# Patient Record
Sex: FEMALE | Race: NATIVE HAWAIIAN OR OTHER PACIFIC ISLANDER | Employment: FULL TIME | URBAN - METROPOLITAN AREA
[De-identification: names, ages, dates, MRNs, and addresses within clinical notes are randomized per-mention and may not be internally consistent; named-entity substitution may affect disease eponyms.]

---

## 2017-03-21 ENCOUNTER — EMPLOYEE WELLNESS (OUTPATIENT)
Dept: OTHER | Age: 42
End: 2017-03-21

## 2017-03-21 LAB
CHOLESTEROL: 188 MG/DL
GLUCOSE BLD-MCNC: 155 MG/DL (ref 70–140)
HBA1C MFR BLD: 5.7 % (ref 4.2–6.3)
HDLC SERPL-MCNC: 47 MG/DL
LDL CHOLESTEROL CALCULATED: 91 MG/DL
PATIENT FASTING?: NO
TRIGL SERPL-MCNC: 252 MG/DL

## 2018-03-20 VITALS — WEIGHT: 177 LBS | BODY MASS INDEX: 30.38 KG/M2

## 2018-03-21 ENCOUNTER — EMPLOYEE WELLNESS (OUTPATIENT)
Dept: OTHER | Age: 43
End: 2018-03-21

## 2018-03-21 LAB
CHOLESTEROL: 194 MG/DL
GLUCOSE BLD-MCNC: 99 MG/DL (ref 70–99)
HDLC SERPL-MCNC: 49 MG/DL
LDL CHOLESTEROL CALCULATED: 108 MG/DL
PATIENT FASTING?: YES
TRIGL SERPL-MCNC: 185 MG/DL

## 2018-04-16 VITALS — BODY MASS INDEX: 31.41 KG/M2 | WEIGHT: 183 LBS

## 2018-09-17 ENCOUNTER — HOSPITAL ENCOUNTER (OUTPATIENT)
Dept: OTHER | Age: 43
Discharge: HOME | End: 2018-09-17
Attending: OBSTETRICS & GYNECOLOGY | Admitting: OBSTETRICS & GYNECOLOGY

## 2018-09-22 ENCOUNTER — HOSPITAL ENCOUNTER (OUTPATIENT)
Age: 43
Discharge: HOME | End: 2018-09-22
Payer: COMMERCIAL

## 2018-09-22 LAB
FOLLICLE STIMULATING HORMONE: 4.3 MLU/ML
TSH HIGH SENSITIVITY: 3.49 UIU/ML (ref 0.27–4.2)

## 2018-09-22 PROCEDURE — 82670 ASSAY OF TOTAL ESTRADIOL: CPT

## 2018-09-22 PROCEDURE — 84436 ASSAY OF TOTAL THYROXINE: CPT

## 2018-09-22 PROCEDURE — 84479 ASSAY OF THYROID (T3 OR T4): CPT

## 2018-09-22 PROCEDURE — 36415 COLL VENOUS BLD VENIPUNCTURE: CPT

## 2018-09-22 PROCEDURE — 84443 ASSAY THYROID STIM HORMONE: CPT

## 2018-09-22 PROCEDURE — 83001 ASSAY OF GONADOTROPIN (FSH): CPT

## 2018-09-25 LAB
ESTRADIOL LEVEL: 167
T3 UPTAKE PERCENT: 31
T4 TOTAL: 5.36 UG/DL

## 2020-06-05 RX ORDER — IBUPROFEN 800 MG/1
800 TABLET ORAL EVERY 6 HOURS PRN
COMMUNITY
End: 2022-11-01 | Stop reason: SDUPTHER

## 2020-06-08 ENCOUNTER — OFFICE VISIT (OUTPATIENT)
Dept: FAMILY MEDICINE CLINIC | Age: 45
End: 2020-06-08
Payer: COMMERCIAL

## 2020-06-08 VITALS
OXYGEN SATURATION: 98 % | DIASTOLIC BLOOD PRESSURE: 82 MMHG | SYSTOLIC BLOOD PRESSURE: 122 MMHG | HEIGHT: 64 IN | BODY MASS INDEX: 32.44 KG/M2 | HEART RATE: 82 BPM | TEMPERATURE: 98.4 F | WEIGHT: 190 LBS

## 2020-06-08 PROCEDURE — 99202 OFFICE O/P NEW SF 15 MIN: CPT | Performed by: NURSE PRACTITIONER

## 2020-06-08 RX ORDER — METHYLPREDNISOLONE 4 MG/1
TABLET ORAL
Qty: 1 KIT | Refills: 0 | Status: SHIPPED | OUTPATIENT
Start: 2020-06-08 | End: 2020-06-14

## 2020-06-08 RX ORDER — KETOCONAZOLE 20 MG/ML
SHAMPOO TOPICAL
Qty: 120 ML | Refills: 0 | Status: SHIPPED | OUTPATIENT
Start: 2020-06-08 | End: 2022-08-22

## 2020-06-08 ASSESSMENT — ENCOUNTER SYMPTOMS
VOMITING: 0
RESPIRATORY NEGATIVE: 1
NAUSEA: 0
DIARRHEA: 0

## 2020-06-08 NOTE — PROGRESS NOTES
Moiz Morrison   45 y.o.  female  [id number]      Chief Complaint   Patient presents with    Muscle Pain     c/o muscle aches chills and pain causing finger numbness onset amonth ago        Subjective:  45 y. o.female is here for a follow up. She has the following chronic/acute medical problems:  Patient Active Problem List   Diagnosis    Abnormal uterine bleeding       Patient is here today with complaints of bilateral shoulder pain and right wrist pain with numbness and tingling to her right fingers. Patient states this is been going on for a couple months but has progressively got worse. Patient states when she wakes up in the morning she has trouble moving her shoulders above her head to get dressed. Patient states her left shoulder is worse than her right shoulder. Patient states as the day goes on the pain is better. Patient states ibuprofen does help with the pain. Patient states she believes she has some psoriasis of her scalp. She states she was given some foam at an urgent care. She states she would like to try the shampoo. Review of Systems   Constitutional: Negative for appetite change, chills, fatigue and fever. HENT: Negative. Respiratory: Negative. Cardiovascular: Negative for chest pain and palpitations. Gastrointestinal: Negative for diarrhea, nausea and vomiting. Musculoskeletal:        Bilateral shoulder pain. Right wrist pain. Skin: Negative for rash. Current Outpatient Medications   Medication Sig Dispense Refill    ketoconazole (NIZORAL) 2 % shampoo Apply topically daily as needed. 120 mL 0    methylPREDNISolone (MEDROL, EMILIE,) 4 MG tablet Take by mouth. 1 kit 0    ibuprofen (ADVIL;MOTRIN) 800 MG tablet Take 800 mg by mouth every 6 hours as needed for Pain       No current facility-administered medications for this visit. Past medical, family,surgical history reviewed today.      Objective:  /82 (Site: Left Upper Arm, Position: Sitting)

## 2020-06-10 ENCOUNTER — VIRTUAL VISIT (OUTPATIENT)
Dept: FAMILY MEDICINE CLINIC | Age: 45
End: 2020-06-10
Payer: COMMERCIAL

## 2020-06-10 PROCEDURE — 99202 OFFICE O/P NEW SF 15 MIN: CPT | Performed by: FAMILY MEDICINE

## 2020-06-10 RX ORDER — DULOXETIN HYDROCHLORIDE 30 MG/1
30 CAPSULE, DELAYED RELEASE ORAL DAILY
Qty: 30 CAPSULE | Refills: 0 | Status: SHIPPED | OUTPATIENT
Start: 2020-06-10 | End: 2020-06-22 | Stop reason: SDUPTHER

## 2020-06-10 ASSESSMENT — PATIENT HEALTH QUESTIONNAIRE - PHQ9
SUM OF ALL RESPONSES TO PHQ QUESTIONS 1-9: 0
2. FEELING DOWN, DEPRESSED OR HOPELESS: 0
1. LITTLE INTEREST OR PLEASURE IN DOING THINGS: 0
SUM OF ALL RESPONSES TO PHQ9 QUESTIONS 1 & 2: 0
SUM OF ALL RESPONSES TO PHQ QUESTIONS 1-9: 0

## 2020-06-11 ASSESSMENT — ENCOUNTER SYMPTOMS: SHORTNESS OF BREATH: 0

## 2020-06-17 ENCOUNTER — TELEPHONE (OUTPATIENT)
Dept: INTERNAL MEDICINE CLINIC | Age: 45
End: 2020-06-17

## 2020-06-17 NOTE — TELEPHONE ENCOUNTER
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 Patient expressed understanding.

## 2020-06-17 NOTE — TELEPHONE ENCOUNTER
Pt called and stated that she went to flu clinic located on [facility] and had an order to do the test. Pt stated that they took the a but there is nothing in Epic. Pt states she got there at 8 and could not find her order so she left and then returned from [facility]) at 9 am to have the test done, ot stated test was done. No results and no sign of pt at flu clinic. I sp to lynne and she stated pt might want to call [facility].  Sp to pt and she will call their but is asking for a call to clear up from raciel please advise

## 2020-06-22 ENCOUNTER — TELEPHONE (OUTPATIENT)
Dept: FAMILY MEDICINE CLINIC | Age: 45
End: 2020-06-22

## 2020-06-22 RX ORDER — DULOXETIN HYDROCHLORIDE 60 MG/1
60 CAPSULE, DELAYED RELEASE ORAL DAILY
Qty: 30 CAPSULE | Refills: 0 | Status: SHIPPED | OUTPATIENT
Start: 2020-06-22 | End: 2020-08-07 | Stop reason: SDUPTHER

## 2020-06-22 NOTE — TELEPHONE ENCOUNTER
Patient state the swelling started a month ago. This is the same symptoms she had at her visit. Patient state her right hand is swollen she can not make a fist.  Left hand, arm, and leg hurts. Worse in the morning it is hard for her to get out of bed. Also in the morning her feet is tender. A hot shower helps her some. But patient state pain was getting better on the prednisone but once she completed it on Saturday the pain came back.

## 2020-06-24 ENCOUNTER — HOSPITAL ENCOUNTER (OUTPATIENT)
Age: 45
Discharge: HOME | End: 2020-06-24
Payer: COMMERCIAL

## 2020-06-24 LAB
ALBUMIN SERPL-MCNC: 4.3 GM/DL (ref 3.4–5)
ALP BLD-CCNC: 60 IU/L (ref 40–129)
ALT SERPL-CCNC: 33 U/L (ref 10–40)
ANION GAP SERPL CALCULATED.3IONS-SCNC: 12 MMOL/L (ref 4–16)
AST SERPL-CCNC: 19 IU/L (ref 15–37)
BILIRUB SERPL-MCNC: 0.6 MG/DL (ref 0–1)
BUN BLDV-MCNC: 11 MG/DL (ref 6–23)
C-REACTIVE PROTEIN, HIGH SENSITIVITY: 101.9 MG/L
CALCIUM SERPL-MCNC: 9.1 MG/DL (ref 8.3–10.6)
CHLORIDE BLD-SCNC: 99 MMOL/L (ref 99–110)
CO2: 25 MMOL/L (ref 21–32)
CREAT SERPL-MCNC: 0.7 MG/DL (ref 0.6–1.1)
ERYTHROCYTE SEDIMENTATION RATE: 103 MM/HR (ref 0–20)
FOLATE: 11.6 NG/ML (ref 3.1–17.5)
GFR AFRICAN AMERICAN: >60 ML/MIN/1.73M2
GFR NON-AFRICAN AMERICAN: >60 ML/MIN/1.73M2
GLUCOSE BLD-MCNC: 109 MG/DL (ref 70–99)
POTASSIUM SERPL-SCNC: 3.5 MMOL/L (ref 3.5–5.1)
SODIUM BLD-SCNC: 136 MMOL/L (ref 135–145)
TOTAL PROTEIN: 7.4 GM/DL (ref 6.4–8.2)
TSH HIGH SENSITIVITY: 4.85 UIU/ML (ref 0.27–4.2)
VITAMIN B-12: 255.3 PG/ML (ref 211–911)
VITAMIN D 25-HYDROXY: 8.11 NG/ML

## 2020-06-24 PROCEDURE — 36415 COLL VENOUS BLD VENIPUNCTURE: CPT

## 2020-06-24 PROCEDURE — 82746 ASSAY OF FOLIC ACID SERUM: CPT

## 2020-06-24 PROCEDURE — 86038 ANTINUCLEAR ANTIBODIES: CPT

## 2020-06-24 PROCEDURE — 82607 VITAMIN B-12: CPT

## 2020-06-24 PROCEDURE — 84443 ASSAY THYROID STIM HORMONE: CPT

## 2020-06-24 PROCEDURE — 86430 RHEUMATOID FACTOR TEST QUAL: CPT

## 2020-06-24 PROCEDURE — 85652 RBC SED RATE AUTOMATED: CPT

## 2020-06-24 PROCEDURE — 82306 VITAMIN D 25 HYDROXY: CPT

## 2020-06-24 PROCEDURE — 80053 COMPREHEN METABOLIC PANEL: CPT

## 2020-06-24 PROCEDURE — 86140 C-REACTIVE PROTEIN: CPT

## 2020-06-26 LAB
ANTI-NUCLEAR ANTIBODY (ANA): NORMAL
RHEUMATOID FACTOR: 21 IU/ML (ref 0–14)

## 2020-06-26 RX ORDER — PREDNISONE 20 MG/1
40 TABLET ORAL DAILY
Qty: 10 TABLET | Refills: 0 | Status: SHIPPED | OUTPATIENT
Start: 2020-06-26 | End: 2020-06-29 | Stop reason: SDUPTHER

## 2020-06-29 RX ORDER — PREDNISONE 20 MG/1
TABLET ORAL
Qty: 10 TABLET | Refills: 0 | Status: SHIPPED | OUTPATIENT
Start: 2020-06-29 | End: 2022-08-22

## 2020-07-13 ENCOUNTER — TELEPHONE (OUTPATIENT)
Dept: FAMILY MEDICINE CLINIC | Age: 45
End: 2020-07-13

## 2020-07-13 RX ORDER — PREDNISONE 1 MG/1
5 TABLET ORAL DAILY
Qty: 30 TABLET | Refills: 0 | Status: SHIPPED | OUTPATIENT
Start: 2020-07-13 | End: 2020-08-12

## 2020-07-13 NOTE — TELEPHONE ENCOUNTER
Patient called asking if she can have Refill on Prednisone. She does not see specialist on 7-28-20. She stated that taking a 1/2 dose did not help at all and would like to take the full dose of 20 mg.

## 2020-07-21 ENCOUNTER — HOSPITAL ENCOUNTER (OUTPATIENT)
Age: 45
Discharge: HOME | End: 2020-07-21
Payer: COMMERCIAL

## 2020-07-21 LAB
ALT SERPL-CCNC: 30 U/L (ref 10–40)
AST SERPL-CCNC: 15 IU/L (ref 15–37)
ERYTHROCYTE SEDIMENTATION RATE: 58 MM/HR (ref 0–20)
HBV SURFACE AB TITR SER: 437.8 {TITER}
HEPATITIS B SURFACE ANTIGEN: NON REACTIVE
HEPATITIS C ANTIBODY: NON REACTIVE

## 2020-07-21 PROCEDURE — 36415 COLL VENOUS BLD VENIPUNCTURE: CPT

## 2020-07-21 PROCEDURE — 86225 DNA ANTIBODY NATIVE: CPT

## 2020-07-21 PROCEDURE — 86704 HEP B CORE ANTIBODY TOTAL: CPT

## 2020-07-21 PROCEDURE — 84450 TRANSFERASE (AST) (SGOT): CPT

## 2020-07-21 PROCEDURE — 84460 ALANINE AMINO (ALT) (SGPT): CPT

## 2020-07-21 PROCEDURE — 86235 NUCLEAR ANTIGEN ANTIBODY: CPT

## 2020-07-21 PROCEDURE — 86038 ANTINUCLEAR ANTIBODIES: CPT

## 2020-07-21 PROCEDURE — 86803 HEPATITIS C AB TEST: CPT

## 2020-07-21 PROCEDURE — 86707 HEPATITIS BE ANTIBODY: CPT

## 2020-07-21 PROCEDURE — 85652 RBC SED RATE AUTOMATED: CPT

## 2020-07-21 PROCEDURE — 86706 HEP B SURFACE ANTIBODY: CPT

## 2020-07-21 PROCEDURE — 87350 HEPATITIS BE AG IA: CPT

## 2020-07-21 PROCEDURE — 84165 PROTEIN E-PHORESIS SERUM: CPT

## 2020-07-21 PROCEDURE — 86200 CCP ANTIBODY: CPT

## 2020-07-21 PROCEDURE — 87340 HEPATITIS B SURFACE AG IA: CPT

## 2020-07-22 LAB
ALBUMIN ELP: 3.8 GM/DL (ref 3.2–5.6)
ALPHA-1-GLOBULIN: 0.3 GM/DL (ref 0.1–0.4)
ALPHA-2-GLOBULIN: 1 GM/DL (ref 0.4–1.2)
BETA GLOBULIN: 1.2 GM/DL (ref 0.5–1.3)
CENTROMERE AB SCREEN: 0 AU/ML (ref 0–40)
ENA TO SSA (RO) ANTIBODY: 2 AU/ML (ref 0–40)
ENA TO SSB (LA) ANTIBODY: 0 AU/ML (ref 0–40)
GAMMA GLOBULIN: 0.9 GM/DL (ref 0.5–1.6)
HEPATITIS B CORE TOTAL ANTIBODY: NEGATIVE
HEPATITIS BE ANTIBODY: NEGATIVE
SCLERODERMA (SCL-70) AB: 1 AU/ML (ref 0–40)
SPEP INTERPRETATION: NORMAL
SSA 60 RO IGG ANTIBODY: 0 AU/ML (ref 0–40)
TOTAL PROTEIN: 7.2 GM/DL (ref 6.4–8.2)

## 2020-07-23 LAB
ANTI DNA DOUBLE STRANDED: NORMAL
ANTI-NUCLEAR ANTIBODY (ANA): NORMAL
CYCLIC CITRULLINATED PEPTIDE ANTIBODY IGG: 10 UNITS (ref 0–19)
HEPATITIS BE ANTIGEN: NEGATIVE

## 2020-08-07 RX ORDER — DULOXETIN HYDROCHLORIDE 60 MG/1
60 CAPSULE, DELAYED RELEASE ORAL DAILY
Qty: 90 CAPSULE | Refills: 0 | Status: SHIPPED | OUTPATIENT
Start: 2020-08-07 | End: 2022-07-11

## 2020-09-08 ENCOUNTER — HOSPITAL ENCOUNTER (OUTPATIENT)
Age: 45
Setting detail: SPECIMEN
Discharge: HOME | End: 2020-09-08
Payer: COMMERCIAL

## 2020-09-08 LAB
ALT SERPL-CCNC: 26 U/L (ref 10–40)
AST SERPL-CCNC: 29 IU/L (ref 15–37)
BASOPHILS ABSOLUTE: 0.1 K/CU MM
BASOPHILS RELATIVE PERCENT: 0.4 % (ref 0–1)
DIFFERENTIAL TYPE: ABNORMAL
EOSINOPHILS ABSOLUTE: 0.1 K/CU MM
EOSINOPHILS RELATIVE PERCENT: 0.4 % (ref 0–3)
HCT VFR BLD CALC: 36.4 % (ref 37–47)
HEMOGLOBIN: 11 GM/DL (ref 12.5–16)
IMMATURE NEUTROPHIL %: 0.6 % (ref 0–0.43)
LYMPHOCYTES ABSOLUTE: 1.8 K/CU MM
LYMPHOCYTES RELATIVE PERCENT: 15 % (ref 24–44)
MCH RBC QN AUTO: 30.8 PG (ref 27–31)
MCHC RBC AUTO-ENTMCNC: 30.2 % (ref 32–36)
MCV RBC AUTO: 102 FL (ref 78–100)
MONOCYTES ABSOLUTE: 0.7 K/CU MM
MONOCYTES RELATIVE PERCENT: 5.6 % (ref 0–4)
NUCLEATED RBC %: 0 %
PDW BLD-RTO: 14.5 % (ref 11.7–14.9)
PLATELET # BLD: 339 K/CU MM (ref 140–440)
PMV BLD AUTO: 9.6 FL (ref 7.5–11.1)
RBC # BLD: 3.57 M/CU MM (ref 4.2–5.4)
SEGMENTED NEUTROPHILS ABSOLUTE COUNT: 9.1 K/CU MM
SEGMENTED NEUTROPHILS RELATIVE PERCENT: 78 % (ref 36–66)
TOTAL IMMATURE NEUTOROPHIL: 0.07 K/CU MM
TOTAL NUCLEATED RBC: 0 K/CU MM
WBC # BLD: 11.7 K/CU MM (ref 4–10.5)

## 2020-09-08 PROCEDURE — 85025 COMPLETE CBC W/AUTO DIFF WBC: CPT

## 2020-09-08 PROCEDURE — 84460 ALANINE AMINO (ALT) (SGPT): CPT

## 2020-09-08 PROCEDURE — 84450 TRANSFERASE (AST) (SGOT): CPT

## 2020-09-08 PROCEDURE — 36415 COLL VENOUS BLD VENIPUNCTURE: CPT

## 2020-10-22 ENCOUNTER — HOSPITAL ENCOUNTER (OUTPATIENT)
Age: 45
Discharge: HOME | End: 2020-10-22
Payer: COMMERCIAL

## 2020-10-22 LAB
ALT SERPL-CCNC: 33 U/L (ref 10–40)
AST SERPL-CCNC: 14 IU/L (ref 15–37)
BASOPHILS ABSOLUTE: 0 K/CU MM
BASOPHILS RELATIVE PERCENT: 0.3 % (ref 0–1)
DIFFERENTIAL TYPE: ABNORMAL
EOSINOPHILS ABSOLUTE: 0 K/CU MM
EOSINOPHILS RELATIVE PERCENT: 0.4 % (ref 0–3)
ERYTHROCYTE SEDIMENTATION RATE: 25 MM/HR (ref 0–20)
HCT VFR BLD CALC: 37.4 % (ref 37–47)
HEMOGLOBIN: 11.9 GM/DL (ref 12.5–16)
IMMATURE NEUTROPHIL %: 0.8 % (ref 0–0.43)
LYMPHOCYTES ABSOLUTE: 2.5 K/CU MM
LYMPHOCYTES RELATIVE PERCENT: 31.8 % (ref 24–44)
MCH RBC QN AUTO: 30.7 PG (ref 27–31)
MCHC RBC AUTO-ENTMCNC: 31.8 % (ref 32–36)
MCV RBC AUTO: 96.4 FL (ref 78–100)
MONOCYTES ABSOLUTE: 0.4 K/CU MM
MONOCYTES RELATIVE PERCENT: 5.1 % (ref 0–4)
NUCLEATED RBC %: 0 %
PDW BLD-RTO: 14.5 % (ref 11.7–14.9)
PLATELET # BLD: 301 K/CU MM (ref 140–440)
PMV BLD AUTO: 9.3 FL (ref 7.5–11.1)
RBC # BLD: 3.88 M/CU MM (ref 4.2–5.4)
SEGMENTED NEUTROPHILS ABSOLUTE COUNT: 4.8 K/CU MM
SEGMENTED NEUTROPHILS RELATIVE PERCENT: 61.6 % (ref 36–66)
TOTAL IMMATURE NEUTOROPHIL: 0.06 K/CU MM
TOTAL NUCLEATED RBC: 0 K/CU MM
WBC # BLD: 7.8 K/CU MM (ref 4–10.5)

## 2020-10-22 PROCEDURE — 36415 COLL VENOUS BLD VENIPUNCTURE: CPT

## 2020-10-22 PROCEDURE — 86480 TB TEST CELL IMMUN MEASURE: CPT

## 2020-10-22 PROCEDURE — 84460 ALANINE AMINO (ALT) (SGPT): CPT

## 2020-10-22 PROCEDURE — 85025 COMPLETE CBC W/AUTO DIFF WBC: CPT

## 2020-10-22 PROCEDURE — 85652 RBC SED RATE AUTOMATED: CPT

## 2020-10-22 PROCEDURE — 84450 TRANSFERASE (AST) (SGOT): CPT

## 2020-10-27 LAB
QUANTI TB1 MINUS NIL: 0 IU/ML (ref 0–0.34)
QUANTI TB2 MINUS NIL: 0.11 IU/ML (ref 0–0.34)
QUANTIFERON (R) TB GOLD (INCUBATED): NEGATIVE
QUANTIFERON MITOGEN MINUS NIL: 4.43 IU/ML
QUANTIFERON NIL: 0.07 IU/ML

## 2021-04-24 ENCOUNTER — HOSPITAL ENCOUNTER (OUTPATIENT)
Age: 46
Discharge: HOME | End: 2021-04-24
Payer: COMMERCIAL

## 2021-04-24 LAB
ALT SERPL-CCNC: 115 U/L (ref 10–40)
AST SERPL-CCNC: 63 IU/L (ref 15–37)
BASOPHILS ABSOLUTE: 0 K/CU MM
BASOPHILS RELATIVE PERCENT: 0.7 % (ref 0–1)
BUN BLDV-MCNC: 16 MG/DL (ref 6–23)
CREAT SERPL-MCNC: 0.7 MG/DL (ref 0.6–1.1)
DIFFERENTIAL TYPE: ABNORMAL
EOSINOPHILS ABSOLUTE: 0.1 K/CU MM
EOSINOPHILS RELATIVE PERCENT: 1 % (ref 0–3)
GFR AFRICAN AMERICAN: >60 ML/MIN/1.73M2
GFR NON-AFRICAN AMERICAN: >60 ML/MIN/1.73M2
HCT VFR BLD CALC: 38 % (ref 37–47)
HEMOGLOBIN: 12.9 GM/DL (ref 12.5–16)
IMMATURE NEUTROPHIL %: 0.2 % (ref 0–0.43)
LYMPHOCYTES ABSOLUTE: 2.2 K/CU MM
LYMPHOCYTES RELATIVE PERCENT: 38.1 % (ref 24–44)
MCH RBC QN AUTO: 30.2 PG (ref 27–31)
MCHC RBC AUTO-ENTMCNC: 33.9 % (ref 32–36)
MCV RBC AUTO: 89 FL (ref 78–100)
MONOCYTES ABSOLUTE: 0.4 K/CU MM
MONOCYTES RELATIVE PERCENT: 6.8 % (ref 0–4)
NUCLEATED RBC %: 0 %
PDW BLD-RTO: 12.9 % (ref 11.7–14.9)
PLATELET # BLD: 266 K/CU MM (ref 140–440)
PMV BLD AUTO: 9.8 FL (ref 7.5–11.1)
RBC # BLD: 4.27 M/CU MM (ref 4.2–5.4)
SEGMENTED NEUTROPHILS ABSOLUTE COUNT: 3.1 K/CU MM
SEGMENTED NEUTROPHILS RELATIVE PERCENT: 53.2 % (ref 36–66)
TOTAL IMMATURE NEUTOROPHIL: 0.01 K/CU MM
TOTAL NUCLEATED RBC: 0 K/CU MM
WBC # BLD: 5.8 K/CU MM (ref 4–10.5)

## 2021-04-24 PROCEDURE — 85025 COMPLETE CBC W/AUTO DIFF WBC: CPT

## 2021-04-24 PROCEDURE — 84520 ASSAY OF UREA NITROGEN: CPT

## 2021-04-24 PROCEDURE — 82565 ASSAY OF CREATININE: CPT

## 2021-04-24 PROCEDURE — 36415 COLL VENOUS BLD VENIPUNCTURE: CPT

## 2021-04-24 PROCEDURE — 84450 TRANSFERASE (AST) (SGOT): CPT

## 2021-04-24 PROCEDURE — 86480 TB TEST CELL IMMUN MEASURE: CPT

## 2021-04-24 PROCEDURE — 84460 ALANINE AMINO (ALT) (SGPT): CPT

## 2021-04-27 LAB
QUANTI TB1 MINUS NIL: 0.05 IU/ML (ref 0–0.34)
QUANTI TB2 MINUS NIL: 0.11 IU/ML (ref 0–0.34)
QUANTIFERON (R) TB GOLD (INCUBATED): NEGATIVE IU/ML
QUANTIFERON MITOGEN MINUS NIL: 7.82 IU/ML
QUANTIFERON NIL: 0.09 IU/ML

## 2021-05-25 ENCOUNTER — HOSPITAL ENCOUNTER (OUTPATIENT)
Age: 46
Discharge: HOME | End: 2021-05-25
Payer: COMMERCIAL

## 2021-05-25 LAB
ALT SERPL-CCNC: 132 U/L (ref 10–40)
AST SERPL-CCNC: 82 IU/L (ref 15–37)
BASOPHILS ABSOLUTE: 0 K/CU MM
BASOPHILS RELATIVE PERCENT: 0.4 % (ref 0–1)
BUN BLDV-MCNC: 10 MG/DL (ref 6–23)
CREAT SERPL-MCNC: 0.5 MG/DL (ref 0.6–1.1)
DIFFERENTIAL TYPE: ABNORMAL
EOSINOPHILS ABSOLUTE: 0.1 K/CU MM
EOSINOPHILS RELATIVE PERCENT: 1.1 % (ref 0–3)
GFR AFRICAN AMERICAN: >60 ML/MIN/1.73M2
GFR NON-AFRICAN AMERICAN: >60 ML/MIN/1.73M2
HCT VFR BLD CALC: 35.8 % (ref 37–47)
HEMOGLOBIN: 12.2 GM/DL (ref 12.5–16)
IMMATURE NEUTROPHIL %: 0.5 % (ref 0–0.43)
LYMPHOCYTES ABSOLUTE: 2.9 K/CU MM
LYMPHOCYTES RELATIVE PERCENT: 38.8 % (ref 24–44)
MCH RBC QN AUTO: 30.6 PG (ref 27–31)
MCHC RBC AUTO-ENTMCNC: 34.1 % (ref 32–36)
MCV RBC AUTO: 89.7 FL (ref 78–100)
MONOCYTES ABSOLUTE: 0.5 K/CU MM
MONOCYTES RELATIVE PERCENT: 6.3 % (ref 0–4)
NUCLEATED RBC %: 0 %
PDW BLD-RTO: 13 % (ref 11.7–14.9)
PLATELET # BLD: 275 K/CU MM (ref 140–440)
PMV BLD AUTO: 9.9 FL (ref 7.5–11.1)
RBC # BLD: 3.99 M/CU MM (ref 4.2–5.4)
SEGMENTED NEUTROPHILS ABSOLUTE COUNT: 4 K/CU MM
SEGMENTED NEUTROPHILS RELATIVE PERCENT: 52.9 % (ref 36–66)
TOTAL IMMATURE NEUTOROPHIL: 0.04 K/CU MM
TOTAL NUCLEATED RBC: 0 K/CU MM
WBC # BLD: 7.5 K/CU MM (ref 4–10.5)

## 2021-05-25 PROCEDURE — 84520 ASSAY OF UREA NITROGEN: CPT

## 2021-05-25 PROCEDURE — 85025 COMPLETE CBC W/AUTO DIFF WBC: CPT

## 2021-05-25 PROCEDURE — 36415 COLL VENOUS BLD VENIPUNCTURE: CPT

## 2021-05-25 PROCEDURE — 84460 ALANINE AMINO (ALT) (SGPT): CPT

## 2021-05-25 PROCEDURE — 82565 ASSAY OF CREATININE: CPT

## 2021-05-25 PROCEDURE — 84450 TRANSFERASE (AST) (SGOT): CPT

## 2021-07-07 ENCOUNTER — TELEPHONE (OUTPATIENT)
Dept: INTERNAL MEDICINE | Age: 46
End: 2021-07-07

## 2021-07-14 NOTE — TELEPHONE ENCOUNTER
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 She is no longer a [employer] employee. She also reports she has not taken the medication in 3 months.

## 2021-07-27 NOTE — TELEPHONE ENCOUNTER
Medication Management Service    Date: 7/27/2021  Patient's Name: Damon Morrison YOB: 1975            _____________________________________________________________________________________________      Patient is no longer taking SMS medication.  Patient will be disenrolled from [employer], [address]  Ambulatory Clinical Pharmacist   Specialty Medication Service  Phone: Antolin Cortez in place:  No   Time Spent (min): 10

## 2022-07-10 SDOH — HEALTH STABILITY: PHYSICAL HEALTH
ON AVERAGE, HOW MANY DAYS PER WEEK DO YOU ENGAGE IN MODERATE TO STRENUOUS EXERCISE (LIKE A BRISK WALK)?: PATIENT DECLINED

## 2022-07-11 ENCOUNTER — OFFICE VISIT (OUTPATIENT)
Dept: FAMILY MEDICINE CLINIC | Age: 47
End: 2022-07-11
Payer: COMMERCIAL

## 2022-07-11 VITALS
HEIGHT: 64 IN | RESPIRATION RATE: 16 BRPM | WEIGHT: 196.8 LBS | DIASTOLIC BLOOD PRESSURE: 78 MMHG | BODY MASS INDEX: 33.6 KG/M2 | OXYGEN SATURATION: 97 % | HEART RATE: 96 BPM | SYSTOLIC BLOOD PRESSURE: 118 MMHG | TEMPERATURE: 97.5 F

## 2022-07-11 DIAGNOSIS — Z13.1 SCREENING FOR DIABETES MELLITUS: ICD-10-CM

## 2022-07-11 DIAGNOSIS — K29.60 NSAID INDUCED GASTRITIS: ICD-10-CM

## 2022-07-11 DIAGNOSIS — Z13.220 SCREENING FOR HYPERLIPIDEMIA: ICD-10-CM

## 2022-07-11 DIAGNOSIS — M19.90 CHRONIC INFLAMMATORY ARTHRITIS: ICD-10-CM

## 2022-07-11 DIAGNOSIS — E09.9 STEROID-INDUCED DIABETES MELLITUS, INITIAL ENCOUNTER (HCC): ICD-10-CM

## 2022-07-11 DIAGNOSIS — Z00.00 ENCOUNTER FOR WELL ADULT EXAM WITHOUT ABNORMAL FINDINGS: Primary | ICD-10-CM

## 2022-07-11 DIAGNOSIS — T38.0X5A STEROID-INDUCED DIABETES MELLITUS, INITIAL ENCOUNTER (HCC): ICD-10-CM

## 2022-07-11 DIAGNOSIS — T39.395A NSAID INDUCED GASTRITIS: ICD-10-CM

## 2022-07-11 PROCEDURE — 99396 PREV VISIT EST AGE 40-64: CPT | Performed by: FAMILY MEDICINE

## 2022-07-11 PROCEDURE — 99214 OFFICE O/P EST MOD 30 MIN: CPT | Performed by: FAMILY MEDICINE

## 2022-07-11 PROCEDURE — 36415 COLL VENOUS BLD VENIPUNCTURE: CPT | Performed by: FAMILY MEDICINE

## 2022-07-11 RX ORDER — PREDNISONE 1 MG/1
5 TABLET ORAL DAILY
Qty: 10 TABLET | Refills: 0 | Status: SHIPPED | OUTPATIENT
Start: 2022-07-11 | End: 2022-07-11

## 2022-07-11 RX ORDER — OMEPRAZOLE 40 MG/1
40 CAPSULE, DELAYED RELEASE ORAL
Qty: 90 CAPSULE | Refills: 0 | Status: SHIPPED | OUTPATIENT
Start: 2022-07-11 | End: 2022-09-19 | Stop reason: SDUPTHER

## 2022-07-11 RX ORDER — HYDROXYCHLOROQUINE SULFATE 200 MG/1
200 TABLET, FILM COATED ORAL 2 TIMES DAILY
Qty: 60 TABLET | Refills: 0 | Status: SHIPPED
Start: 2022-07-11 | End: 2022-08-22 | Stop reason: SINTOL

## 2022-07-11 RX ORDER — PREDNISONE 1 MG/1
5 TABLET ORAL DAILY
Qty: 30 TABLET | Refills: 0 | Status: SHIPPED | OUTPATIENT
Start: 2022-07-11 | End: 2022-08-10

## 2022-07-11 ASSESSMENT — PATIENT HEALTH QUESTIONNAIRE - PHQ9
SUM OF ALL RESPONSES TO PHQ QUESTIONS 1-9: 9
8. MOVING OR SPEAKING SO SLOWLY THAT OTHER PEOPLE COULD HAVE NOTICED. OR THE OPPOSITE, BEING SO FIGETY OR RESTLESS THAT YOU HAVE BEEN MOVING AROUND A LOT MORE THAN USUAL: 0
3. TROUBLE FALLING OR STAYING ASLEEP: 3
SUM OF ALL RESPONSES TO PHQ QUESTIONS 1-9: 9
10. IF YOU CHECKED OFF ANY PROBLEMS, HOW DIFFICULT HAVE THESE PROBLEMS MADE IT FOR YOU TO DO YOUR WORK, TAKE CARE OF THINGS AT HOME, OR GET ALONG WITH OTHER PEOPLE: 0
SUM OF ALL RESPONSES TO PHQ QUESTIONS 1-9: 9
6. FEELING BAD ABOUT YOURSELF - OR THAT YOU ARE A FAILURE OR HAVE LET YOURSELF OR YOUR FAMILY DOWN: 0
SUM OF ALL RESPONSES TO PHQ9 QUESTIONS 1 & 2: 3
7. TROUBLE CONCENTRATING ON THINGS, SUCH AS READING THE NEWSPAPER OR WATCHING TELEVISION: 0
4. FEELING TIRED OR HAVING LITTLE ENERGY: 2
5. POOR APPETITE OR OVEREATING: 1
2. FEELING DOWN, DEPRESSED OR HOPELESS: 2
SUM OF ALL RESPONSES TO PHQ QUESTIONS 1-9: 9
9. THOUGHTS THAT YOU WOULD BE BETTER OFF DEAD, OR OF HURTING YOURSELF: 0
1. LITTLE INTEREST OR PLEASURE IN DOING THINGS: 1

## 2022-07-11 ASSESSMENT — ENCOUNTER SYMPTOMS
EYE PAIN: 0
COUGH: 0
CONSTIPATION: 0
DIARRHEA: 0
SHORTNESS OF BREATH: 0
ABDOMINAL PAIN: 1
NAUSEA: 0
VOMITING: 0
BACK PAIN: 1
TROUBLE SWALLOWING: 0
APNEA: 0

## 2022-07-11 NOTE — PATIENT INSTRUCTIONS
afraid of having pain, losing your independence, or being kept alive by machines.)   Where would you prefer to die? (Your home? A hospital? A nursing home?)   Do you want to donate your organs when you die?  Do you want certain Roman Catholic practices performed before you die? When should you call for help? Be sure to contact your doctor if you have any questions. Where can you learn more? Go to https://[email or web address] [email or web address] org and sign in to your X-1 account. Enter R264 in the [employer] [place] box to learn more about \"Advance Directives: Care Instructions. \"     If you do not have an account, please click on the \"Sign Up Now\" link. Current as of: October 18, 2021               Content Version: 13.3  © 2006-2022 Healthwise, [employer]. Care instructions adapted under license by [facility] ([facility]). If you have questions about a medical condition or this instruction, always ask your healthcare professional. [address] any warranty or liability for your use of this information. Eating Healthy Foods: Care Instructions  Your Care Instructions     Eating healthy foods can help lower your risk for disease. Healthy food gives you energy and keeps your heart strong, your brain active, your musclesworking, and your bones strong. A healthy diet includes a variety of foods from the basic food groups: grains, vegetables, fruits, milk and milk products, and meat and beans. Some people may eat more of their favorite foods from only one food group and, as a result, miss getting the nutrients they need. So, it is important to pay attention not only to what you eat but also to what you are missing from your diet. You caneat a healthy, balanced diet by making a few small changes. Follow-up care is a key part of your treatment and safety. Be sure to make and go to all appointments, and call your doctor if you are having problems.  It's also a good idea to know your test results milk instead of whole milk. ? Eat brown rice instead of white rice, and eat whole wheat pasta instead of white-flour pasta. ? Try low-fat cheeses and low-fat yogurt. ? Add more fruits and vegetables to meals and have them for snacks. ? Add lettuce, tomato, cucumber, and onion to sandwiches. ? Add fruit to yogurt and cereal.  Enjoy food   You can still eat your favorite foods. You just may need to eat less of them. If your favorite foods are high in fat, salt, and sugar, limit how often you eat them, but do not cut them out entirely.  Eat a wide variety of foods. Make healthy choices when eating out   The type of restaurant you choose can help you make healthy choices. Even fast-food chains are now offering more low-fat or healthier choices on the menu.  Choose smaller portions, or take half of your meal home.  When eating out, try:  ? A veggie pizza with a whole wheat crust or grilled chicken (instead of sausage or pepperoni). ? Pasta with roasted vegetables, grilled chicken, or marinara sauce instead of cream sauce. ? A vegetable wrap or grilled chicken wrap. ? Broiled or poached food instead of fried or breaded items. Make healthy choices easy   Buy packaged, prewashed, ready-to-eat fresh vegetables and fruits, such as baby carrots, salad mixes, and chopped or shredded broccoli and cauliflower.  Buy packaged, presliced fruits, such as melon or pineapple.  Choose 100% fruit or vegetable juice instead of soda. Limit juice intake to 4 to 6 oz (½ to ¾ cup) a day.  Blend low-fat yogurt, fruit juice, and canned or frozen fruit to make a smoothie for breakfast or a snack. Where can you learn more? Go to https://WedPics (deja mi)[email or web address].[email or web address] [employer]. org and sign in to your [place] account. Enter D317 in the NeighborMD box to learn more about \"Eating Healthy Foods: Care Instructions. \"     If you do not have an account, please click on the \"Sign Up Now\" link.   Current as of: September 8, 2021               Content Version: 13.3  © 9795-5325 Healthwise, [place]. Care instructions adapted under license by [facility] ([facility]). If you have questions about a medical condition or this instruction, always ask your healthcare professional. [address] any warranty or liability for your use of this information. Well Visit, Ages 25 to 48: Care Instructions  Overview     Well visits can help you stay healthy. Your doctor has checked your overall health and may have suggested ways to take good care of yourself. Your doctor also may have recommended tests. At home, you can help prevent illness withhealthy eating, regular exercise, and other steps. Follow-up care is a key part of your treatment and safety. Be sure to make and go to all appointments, and call your doctor if you are having problems. It's also a good idea to know your test results and keep alist of the medicines you take. How can you care for yourself at home?  Get screening tests that you and your doctor decide on. Screening helps find diseases before any symptoms appear.  Eat healthy foods. Choose fruits, vegetables, whole grains, protein, and low-fat dairy foods. Limit fat, especially saturated fat. Reduce salt in your diet.  Limit alcohol. If you are a man, have no more than 2 drinks a day or 14 drinks a week. If you are a woman, have no more than 1 drink a day or 7 drinks a week.  Get at least 30 minutes of physical activity on most days of the week. Walking is a good choice. You also may want to do other activities, such as running, swimming, cycling, or playing tennis or team sports. Discuss any changes in your exercise program with your doctor.  Reach and stay at a healthy weight. This will lower your risk for many problems, such as obesity, diabetes, heart disease, and high blood pressure.  Do not smoke or allow others to smoke around you.  If you need help quitting, talk to your doctor about stop-smoking programs and medicines. These can increase your chances of quitting for good.  Care for your mental health. It is easy to get weighed down by worry and stress. Learn strategies to manage stress, like deep breathing and mindfulness, and stay connected with your family and community. If you find you often feel sad or hopeless, talk with your doctor. Treatment can help.  Talk to your doctor about whether you have any risk factors for sexually transmitted infections (STIs). You can help prevent STIs if you wait to have sex with a new partner (or partners) until you've each been tested for STIs. It also helps if you use condoms (male or female condoms) and if you limit your sex partners to one person who only has sex with you. Vaccines are available for some STIs, such as HPV.  Use birth control if it's important to you to prevent pregnancy. Talk with your doctor about the choices available and what might be best for you.  If you think you may have a problem with alcohol or drug use, talk to your doctor. This includes prescription medicines (such as amphetamines and opioids) and illegal drugs (such as cocaine and methamphetamine). Your doctor can help you figure out what type of treatment is best for you.  Protect your skin from too much sun. When you're outdoors from 10 a.m. to 4 p.m., stay in the shade or cover up with clothing and a hat with a wide brim. Wear sunglasses that block UV rays. Even when it's cloudy, put broad-spectrum sunscreen (SPF 30 or higher) on any exposed skin.  See a dentist one or two times a year for checkups and to have your teeth cleaned.  Wear a seat belt in the car. When should you call for help? Watch closely for changes in your health, and be sure to contact your doctor if you have any problems or symptoms that concern you. Where can you learn more? Go to https://jian.health-partners. org and sign in to your Pro[employer] account.  Enter P072 in the Search Health Information box to learn more about \"Well Visit, Ages 25 to 48: Care Instructions. \"     If you do not have an account, please click on the \"Sign Up Now\" link. Current as of: October 6, 2021               Content Version: 13.3  © 5264-1071 Healthwise, Incorporated. Care instructions adapted under license by [facility] ([facility]). If you have questions about a medical condition or this instruction, always ask your healthcare professional. [address] any warranty or liability for your use of this information. Learning About Changing a Habit by Setting Goals  How can you change a habit? If you've decided to change a habit--whether it's quitting smoking, lowering your blood pressure, becoming more active, or doing something else to improve your health--congratulations! Making that decision is the first step towardmaking a change. What happens next? Have a reason. Set goals you can reach. Prepare forslip-ups. And get support. What's your reason? Your reason for wanting to change a habit is really important. Maybe you want to quit smoking so that you can avoid future health problems. Or maybe you want to eat a healthier diet so you can lose weight. If you have high blood pressure, your reason may be clear: to lower your blood pressure. Maybe yousmoke and want to save money on cigarettes. You need to feel ready to make a change. If you don't feel ready now, that's okay. You can still be thinking and planning. When you truly want to [facility], you're ready for the next step. It's not easy to change habits--but you can do it. Taking the time to reallythink about what will motivate or inspire you will help you reach your goals. How do you set goals? Setting goals can help a lot when you're trying to make a healthy change.  Focus on small goals. This will help you reach larger goals over time. With smaller goals, you'll have success more often, which will help you stay with it. For example, your large goal may be to lose 20 pounds. Your small goal could be to lose 5.   Write down your goals. This will help you remember, and you'll have a clearer idea of what you want to achieve. Use a journal or notebook to record your goals. Hang up your plan where you will see it often as a reminder of what you're trying to do.  Make your goals specific. Specific goals help you measure your progress. For example, setting a goal to eat one extra serving of vegetables a day is better than a general goal to \"eat more vegetables. \"   Focus on one goal at a time. By doing this, you're less likely to feel overwhelmed and then give up.  When you reach a goal, reward yourself. Celebrate your new behavior and success for several days, and then think about setting your next goal.  How can you prepare for slip-ups? It's perfectly normal to try to change a habit, go along fine for a while, and then have a setback. Lots of people try and try again before they reach theirgoals. What are the things that might cause a setback for you? If you have tried tochange a habit before, think about what helped you and what got in your way. By thinking about these barriers now, you can plan ahead for how to deal withthem if they happen. There will be times when you slip up and don't make your goal for the week. When that happens, don't get mad at yourself. Learn from the experience. Ask yourself what got in the way of reaching your goal. Positive thinking goes along way when you're making lifestyle changes. How can you get support?  Get a partner. It's motivating to know that someone is trying to make the same change that you're making, like being more active or changing your eating habits. You have someone who is counting on you to help them succeed. That person can also remind you how far you've come.  Get friends and family involved. They can exercise with you.  Or they can encourage you by saying how they admire what you are doing. Family members can join you in your healthy eating efforts. Don't be afraid to tell family and friends that their encouragement makes a big difference to you.  Join a class or support group. People in these groups often have some of the same barriers you have. They can give you support when you don't feel like staying with your plan. They can boost your morale when you need a lift. Alvin Manoj also find a number of online support groups.  Encourage yourself. When you feel like giving up, don't waste energy feeling bad about yourself. Remember your reason for wanting to change, think about the progress you've made, and give yourself a pep talk and a pat on the back.  Get professional help. A dietitian can help you make your diet healthier while still allowing you to eat foods that you enjoy. A  or physical therapist can help design an exercise program that is fun and easy to stay on. A counselor, a , or your doctor can help you overcome hurdles, reduce stress, or quit smoking. Where can you learn more? Go to https://TrendPopeSush.io.QuantaLife. org and sign in to your Ambarella account. Enter H535 in the [employer] box to learn more about \"Learning About Changing a Habit by Setting Goals. \"     If you do not have an account, please click on the \"Sign Up Now\" link. Current as of: February 9, 2022               Content Version: 13.3  © 5368-1836 HealthVandiver, Incorporated. Care instructions adapted under license by [facility] ([facility]). If you have questions about a medical condition or this instruction, always ask your healthcare professional. [place] [address] any warranty or liability for your use of this information.

## 2022-07-11 NOTE — ASSESSMENT & PLAN NOTE
I will prescribe some omeprazole for stomach protection given that her arthritis has caused her to be taking a lot of ibuprofen.

## 2022-07-11 NOTE — PROGRESS NOTES
Current Outpatient Medications   Medication Sig Dispense Refill    hydroxychloroquine (PLAQUENIL) 200 MG tablet Take 1 tablet by mouth 2 times daily 60 tablet 0    omeprazole (PRILOSEC) 40 MG delayed release capsule Take 1 capsule by mouth every morning (before breakfast) 90 capsule 0    predniSONE (DELTASONE) 5 MG tablet Take 1 tablet by mouth daily 30 tablet 0    predniSONE (DELTASONE) 20 MG tablet 1 tab daily for 5 days, then 1/2 tab daily for 5 days 10 tablet 0    ibuprofen (ADVIL;MOTRIN) 800 MG tablet Take 800 mg by mouth every 6 hours as needed for Pain      ketoconazole (NIZORAL) 2 % shampoo Apply topically daily as needed. (Patient not taking: Reported on 6/10/2020) 120 mL 0     No current facility-administered medications for this visit. OBJECTIVE    /78 (Site: Left Upper Arm, Position: Sitting, Cuff Size: Medium Adult)   Pulse 96   Temp 97.5 °F (36.4 °C) (Infrared)   Resp 16   Ht 5' 4\" (1.626 m)   Wt 196 lb 12.8 oz (89.3 kg)   SpO2 97%   BMI 33.78 kg/m²     Physical Exam   Constitutional:       General: Not in acute distress. Appearance: Normal appearance. Not ill-appearing, toxic-appearing or diaphoretic. HENT:      Head: Normocephalic. Right Ear: Tympanic membrane, ear canal and external ear normal.      Left Ear: Tympanic membrane, ear canal and external ear normal.      Nose: No rhinorrhea. Mouth/Throat:      Mouth: Mucous membranes are moist.      Pharynx: Oropharynx is clear. No oropharyngeal exudate or posterior oropharyngeal erythema. Class 4 airway. Eyes:      General: No scleral icterus. Right eye: No discharge. Left eye: No discharge. Conjunctiva/sclera: Conjunctivae normal.   Neck:      Thyroid: No thyroid mass, thyromegaly or thyroid tenderness. Neck 14.75 inch circ. Cardiovascular:      Rate and Rhythm: Normal rate and regular rhythm.       Pulses:           Posterior tibial pulses are 2+ on the right side and 2+ on the left side. Heart sounds: Normal heart sounds. No murmur heard. No friction rub. No gallop. Pulmonary:      Effort: Pulmonary effort is normal. No respiratory distress. Breath sounds: Normal breath sounds. No stridor. No wheezing, rhonchi or rales. Abdominal:      General: There is no distension. Palpations: Abdomen is soft. Tenderness: There is no abdominal tenderness. There is no guarding. Musculoskeletal:         General: No deformity. Cervical back: No rigidity. Right lower leg: No edema. Left lower leg: No edema. Lymphadenopathy:      Cervical: No cervical adenopathy. Right upper body: No supraclavicular or axillary adenopathy. Left upper body: No supraclavicular or axillary adenopathy. Lower Body: No right inguinal adenopathy. No left inguinal adenopathy. Skin:     General: Skin is warm. Coloration: Skin is not jaundiced. Neurological:      Mental Status: She is alert. Deep Tendon Reflexes:      Reflex Scores:       Patellar reflexes are 2+ on the right side and 2+ on the left side. Psychiatric:         Attention and Perception: Attention and perception normal.         Mood and Affect: Mood normal.         Speech: Speech normal.         Behavior: Behavior normal.         Thought Content: Thought content normal.    Reviewed rheumatology labs from July 2020. Most are normal except that a sed rate and rheumatoid factor were high. ASSESSMENT AND PLAN    1. Encounter for well adult exam without abnormal findings  2. Screening for diabetes mellitus  -     Hemoglobin A1C  3. Screening for hyperlipidemia  -     Lipid Panel  4. Chronic inflammatory arthritis  Assessment & Plan:   I will put in a referral to rheumatology in order some current rheumatology labs. Also I will prescribe a limited number of prednisone and I will do a 1 month trial of Plaquenil.   Orders:  -     External Referral To Rheumatology  -     CBC with Auto Differential  -

## 2022-07-11 NOTE — ASSESSMENT & PLAN NOTE
I will put in a referral to rheumatology in order some current rheumatology labs. Also I will prescribe a limited number of prednisone and I will do a 1 month trial of Plaquenil.

## 2022-07-12 PROBLEM — E09.9 STEROID-INDUCED DIABETES MELLITUS (CORRECT AND PROPERLY ADMINISTERED) (HCC): Status: ACTIVE | Noted: 2022-07-12

## 2022-07-12 PROBLEM — T38.0X5A STEROID-INDUCED DIABETES MELLITUS (CORRECT AND PROPERLY ADMINISTERED) (HCC): Status: ACTIVE | Noted: 2022-07-12

## 2022-07-12 LAB
A/G RATIO: 1.4 (ref 1.1–2.2)
ALBUMIN SERPL-MCNC: 3.9 G/DL (ref 3.4–5)
ALP BLD-CCNC: 75 U/L (ref 40–129)
ALT SERPL-CCNC: 26 U/L (ref 10–40)
ANION GAP SERPL CALCULATED.3IONS-SCNC: 17 MMOL/L (ref 3–16)
AST SERPL-CCNC: 16 U/L (ref 15–37)
BASOPHILS ABSOLUTE: 0 K/UL (ref 0–0.2)
BASOPHILS RELATIVE PERCENT: 0.2 %
BILIRUB SERPL-MCNC: <0.2 MG/DL (ref 0–1)
BUN BLDV-MCNC: 19 MG/DL (ref 7–20)
C-REACTIVE PROTEIN: 13.7 MG/L (ref 0–5.1)
CALCIUM SERPL-MCNC: 8.8 MG/DL (ref 8.3–10.6)
CHLORIDE BLD-SCNC: 100 MMOL/L (ref 99–110)
CHOLESTEROL, TOTAL: 244 MG/DL (ref 0–199)
CO2: 20 MMOL/L (ref 21–32)
CREAT SERPL-MCNC: 0.7 MG/DL (ref 0.6–1.1)
CYCLIC CITRULLINATED PEPTIDE ANTIBODY IGG: <0.5 U/ML (ref 0–2.9)
EOSINOPHILS ABSOLUTE: 0.1 K/UL (ref 0–0.6)
EOSINOPHILS RELATIVE PERCENT: 0.8 %
ESTIMATED AVERAGE GLUCOSE: 223.1 MG/DL
GFR AFRICAN AMERICAN: >60
GFR NON-AFRICAN AMERICAN: >60
GLUCOSE BLD-MCNC: 304 MG/DL (ref 70–99)
HBA1C MFR BLD: 9.4 %
HCT VFR BLD CALC: 32.9 % (ref 36–48)
HDLC SERPL-MCNC: 54 MG/DL (ref 40–60)
HEMOGLOBIN: 11 G/DL (ref 12–16)
LDL CHOLESTEROL CALCULATED: ABNORMAL MG/DL
LDL CHOLESTEROL DIRECT: 131 MG/DL
LYMPHOCYTES ABSOLUTE: 2.5 K/UL (ref 1–5.1)
LYMPHOCYTES RELATIVE PERCENT: 34.1 %
MCH RBC QN AUTO: 29.5 PG (ref 26–34)
MCHC RBC AUTO-ENTMCNC: 33.3 G/DL (ref 31–36)
MCV RBC AUTO: 88.7 FL (ref 80–100)
MONOCYTES ABSOLUTE: 0.4 K/UL (ref 0–1.3)
MONOCYTES RELATIVE PERCENT: 5.1 %
NEUTROPHILS ABSOLUTE: 4.4 K/UL (ref 1.7–7.7)
NEUTROPHILS RELATIVE PERCENT: 59.8 %
PDW BLD-RTO: 15.6 % (ref 12.4–15.4)
PLATELET # BLD: 367 K/UL (ref 135–450)
PMV BLD AUTO: 7.4 FL (ref 5–10.5)
POTASSIUM SERPL-SCNC: 3.4 MMOL/L (ref 3.5–5.1)
RBC # BLD: 3.71 M/UL (ref 4–5.2)
RHEUMATOID FACTOR: 33 IU/ML
SEDIMENTATION RATE, ERYTHROCYTE: 49 MM/HR (ref 0–20)
SODIUM BLD-SCNC: 137 MMOL/L (ref 136–145)
TOTAL PROTEIN: 6.7 G/DL (ref 6.4–8.2)
TRIGL SERPL-MCNC: 417 MG/DL (ref 0–150)
VLDLC SERPL CALC-MCNC: ABNORMAL MG/DL
WBC # BLD: 7.4 K/UL (ref 4–11)

## 2022-07-12 RX ORDER — LANCETS 30 GAUGE
1 EACH MISCELLANEOUS DAILY
Qty: 100 EACH | Refills: 5 | Status: SHIPPED | OUTPATIENT
Start: 2022-07-12

## 2022-07-12 RX ORDER — METFORMIN HYDROCHLORIDE 500 MG/1
1000 TABLET, EXTENDED RELEASE ORAL
Qty: 180 TABLET | Refills: 1 | Status: SHIPPED | OUTPATIENT
Start: 2022-07-12 | End: 2022-09-19 | Stop reason: SDUPTHER

## 2022-07-12 RX ORDER — GLUCOSAMINE HCL/CHONDROITIN SU 500-400 MG
1 CAPSULE ORAL DAILY PRN
Qty: 100 STRIP | Refills: 3 | Status: SHIPPED | OUTPATIENT
Start: 2022-07-12

## 2022-07-12 RX ORDER — BLOOD-GLUCOSE METER
1 KIT MISCELLANEOUS DAILY
Qty: 1 KIT | Refills: 0 | Status: SHIPPED | OUTPATIENT
Start: 2022-07-12

## 2022-07-28 ENCOUNTER — HOSPITAL ENCOUNTER (EMERGENCY)
Age: 47
Discharge: HOME | End: 2022-07-28
Attending: EMERGENCY MEDICINE
Payer: COMMERCIAL

## 2022-07-28 VITALS
SYSTOLIC BLOOD PRESSURE: 158 MMHG | BODY MASS INDEX: 33.47 KG/M2 | DIASTOLIC BLOOD PRESSURE: 104 MMHG | TEMPERATURE: 98.7 F | OXYGEN SATURATION: 98 % | RESPIRATION RATE: 18 BRPM | HEART RATE: 97 BPM | WEIGHT: 195 LBS

## 2022-07-28 DIAGNOSIS — L27.0 DRUG RASH: Primary | ICD-10-CM

## 2022-07-28 LAB
ALBUMIN SERPL-MCNC: 4.4 GM/DL (ref 3.4–5)
ALP BLD-CCNC: 82 IU/L (ref 40–129)
ALT SERPL-CCNC: 25 U/L (ref 10–40)
ANION GAP SERPL CALCULATED.3IONS-SCNC: 16 MMOL/L (ref 4–16)
AST SERPL-CCNC: 12 IU/L (ref 15–37)
BASOPHILS ABSOLUTE: 0 K/CU MM
BASOPHILS RELATIVE PERCENT: 0.3 % (ref 0–1)
BILIRUB SERPL-MCNC: 0.3 MG/DL (ref 0–1)
BILIRUBIN DIRECT: 0.2 MG/DL (ref 0–0.3)
BILIRUBIN, INDIRECT: 0.1 MG/DL (ref 0–0.7)
BUN BLDV-MCNC: 18 MG/DL (ref 6–23)
CALCIUM SERPL-MCNC: 9.6 MG/DL (ref 8.3–10.6)
CHLORIDE BLD-SCNC: 100 MMOL/L (ref 99–110)
CO2: 23 MMOL/L (ref 21–32)
CREAT SERPL-MCNC: 0.5 MG/DL (ref 0.6–1.1)
DIFFERENTIAL TYPE: ABNORMAL
EOSINOPHILS ABSOLUTE: 0.2 K/CU MM
EOSINOPHILS RELATIVE PERCENT: 1.3 % (ref 0–3)
GFR AFRICAN AMERICAN: >60 ML/MIN/1.73M2
GFR NON-AFRICAN AMERICAN: >60 ML/MIN/1.73M2
GLUCOSE BLD-MCNC: 201 MG/DL (ref 70–99)
HCT VFR BLD CALC: 33.1 % (ref 37–47)
HEMOGLOBIN: 10.9 GM/DL (ref 12.5–16)
IMMATURE NEUTROPHIL %: 0.5 % (ref 0–0.43)
LYMPHOCYTES ABSOLUTE: 1.6 K/CU MM
LYMPHOCYTES RELATIVE PERCENT: 14.1 % (ref 24–44)
MAGNESIUM: 1.8 MG/DL (ref 1.8–2.4)
MCH RBC QN AUTO: 29.1 PG (ref 27–31)
MCHC RBC AUTO-ENTMCNC: 32.9 % (ref 32–36)
MCV RBC AUTO: 88.3 FL (ref 78–100)
MONOCYTES ABSOLUTE: 0.5 K/CU MM
MONOCYTES RELATIVE PERCENT: 4.2 % (ref 0–4)
NUCLEATED RBC %: 0 %
PDW BLD-RTO: 13.1 % (ref 11.7–14.9)
PLATELET # BLD: 357 K/CU MM (ref 140–440)
PMV BLD AUTO: 9.5 FL (ref 7.5–11.1)
POTASSIUM SERPL-SCNC: 3.8 MMOL/L (ref 3.5–5.1)
RBC # BLD: 3.75 M/CU MM (ref 4.2–5.4)
SEGMENTED NEUTROPHILS ABSOLUTE COUNT: 9.2 K/CU MM
SEGMENTED NEUTROPHILS RELATIVE PERCENT: 79.6 % (ref 36–66)
SODIUM BLD-SCNC: 139 MMOL/L (ref 135–145)
TOTAL IMMATURE NEUTOROPHIL: 0.06 K/CU MM
TOTAL NUCLEATED RBC: 0 K/CU MM
TOTAL PROTEIN: 7.4 GM/DL (ref 6.4–8.2)
WBC # BLD: 11.6 K/CU MM (ref 4–10.5)

## 2022-07-28 PROCEDURE — 2500000003 HC RX 250 WO HCPCS: Performed by: EMERGENCY MEDICINE

## 2022-07-28 PROCEDURE — 83735 ASSAY OF MAGNESIUM: CPT

## 2022-07-28 PROCEDURE — 96374 THER/PROPH/DIAG INJ IV PUSH: CPT

## 2022-07-28 PROCEDURE — A4216 STERILE WATER/SALINE, 10 ML: HCPCS | Performed by: EMERGENCY MEDICINE

## 2022-07-28 PROCEDURE — 6360000002 HC RX W HCPCS: Performed by: EMERGENCY MEDICINE

## 2022-07-28 PROCEDURE — 80053 COMPREHEN METABOLIC PANEL: CPT

## 2022-07-28 PROCEDURE — 85025 COMPLETE CBC W/AUTO DIFF WBC: CPT

## 2022-07-28 PROCEDURE — 2580000003 HC RX 258: Performed by: EMERGENCY MEDICINE

## 2022-07-28 PROCEDURE — 99284 EMERGENCY DEPT VISIT MOD MDM: CPT

## 2022-07-28 PROCEDURE — 82248 BILIRUBIN DIRECT: CPT

## 2022-07-28 PROCEDURE — 96372 THER/PROPH/DIAG INJ SC/IM: CPT

## 2022-07-28 RX ORDER — HYDROXYZINE HYDROCHLORIDE 50 MG/ML
50 INJECTION, SOLUTION INTRAMUSCULAR ONCE
Status: COMPLETED | OUTPATIENT
Start: 2022-07-28 | End: 2022-07-28

## 2022-07-28 RX ORDER — HYDROXYZINE PAMOATE 50 MG/1
50 CAPSULE ORAL 3 TIMES DAILY PRN
Qty: 20 CAPSULE | Refills: 0 | Status: SHIPPED | OUTPATIENT
Start: 2022-07-28 | End: 2022-08-11

## 2022-07-28 RX ADMIN — FAMOTIDINE 20 MG: 10 INJECTION, SOLUTION INTRAVENOUS at 04:50

## 2022-07-28 RX ADMIN — HYDROXYZINE HYDROCHLORIDE 50 MG: 50 INJECTION, SOLUTION INTRAMUSCULAR at 05:00

## 2022-07-28 NOTE — ED NOTES
Patient presents to the ED with complaint of itching, redness, and hives. Patient states she believes it is due to a medication she has been taking. Patient states she began taking hydroxychloroquine on the 12th of July and that since then her symptoms have continued to worsen.       Gretchen Oliva RN  07/28/22 [id number]

## 2022-07-28 NOTE — ED PROVIDER NOTES
CHIEF COMPLAINT    Chief Complaint   Patient presents with    Medication Reaction     HPI  Josep Travis is a 42 y.o. female with history of anemia, diabetes, rheumatoid arthritis who presents to the ED with complaints of pruritic rash to her face, back, arms, and ankles. Patient has history of rheumatoid arthritis but unfortunately lost frequent follow-up with her rheumatologist.  She was evaluated by her primary care provider on 07/11 and prescribed Plaquenil for 1 month trial that she initiated on 07/12. Initially over the first week the patient had no adverse effects but then began to notice some itching from her scalp with flaking of the skin of the scalp and subsequently began to have what she described as a butterfly rash to her face and a few erythematous and pruritic lesions on her arms. She stopped taking Plaquenil 5 days ago and was seen in the emergency department yesterday for complaints of paretic rash. At that time she was provided with Pepcid and Benadryl and told to take prednisone daily for 5 days at 20 mg. She had actually been on 5 mg of prednisone daily by her primary care provider. Patient states that she had a dose of prednisone yesterday but no dose today. She is here primarily this evening due to worsening of the pruritic rash to her face. She continues to have pruritic lesions to her back, arms, and ankles. Symptoms are constant rated as moderate to severe in severity. Nothing makes it better. No other new exposures or medications that she is aware of. She states that she had a similar reaction of urticaria with Humira and had tolerated Wynelle Clipper in the past but this was discontinued due to hepatotoxicity. She denies shortness of breath, nausea, vomiting, abdominal pain, difficulty swallowing, oral swelling      REVIEW OF SYSTEMS  Constitutional: No fever, chills or recent illness. Eye: No visual changes  HENT: No earache or sore throat.   Resp: No SOB or productive cough. Cardio: No chest pain or palpitations. GI: No abdominal pain, nausea, vomiting, constipation or diarrhea. No melena. : No dysuria, urgency or frequency. Endocrine: No heat intolerance, no cold intolerance, no polydipsia   Lymphatics: No adenopathy  Musculoskeletal: No new muscle aches or joint pain. Neuro: No headaches. Psych: No homicidal or suicidal thoughts  Skin: Complains of pruritic rash  ? ? PAST MEDICAL HISTORY  Past Medical History:   Diagnosis Date    Abnormal uterine bleeding     pt is scheduled for thermachoice ablation 2013\"heavy bleeding for almost a year- getting worse\"\"have to wear a pad all the time- heavy to light but some kind of drainage every day\"    Anemia     Rheumatoid arthritis ([facility] [address]      FAMILY HISTORY  Family History   Problem Relation Age of Onset    Lupus Brother      SOCIAL HISTORY  Social History     Socioeconomic History    Marital status:      Spouse name: None    Number of children: None    Years of education: None    Highest education level: None   Tobacco Use    Smoking status: Never    Smokeless tobacco: Never   Vaping Use    Vaping Use: Never used   Substance and Sexual Activity    Alcohol use: No    Drug use: No    Sexual activity: Yes     Social Determinants of Health     Physical Activity: Unknown    Days of Exercise per Week: Patient refused   Intimate Partner Violence: Not At Risk    Fear of Current or Ex-Partner: No    Emotionally Abused: No    Physically Abused: No    Sexually Abused: No       SURGICAL HISTORY  Past Surgical History:   Procedure Laterality Date     SECTION      ENDOMETRIAL ABLATION N/A [id number]    D and C     CURRENT MEDICATIONS  Previous Medications    BLOOD GLUCOSE MONITOR STRIPS    1 strip by Other route daily as needed for Other (monitoring blood sugar) Test once a day & as needed for symptoms of irregular blood glucose.  Dispense sufficient amount for indicated testing frequency plus additional to accommodate PRN testing needs. GLUCOSE MONITORING (FREESTYLE FREEDOM) KIT    1 kit by Does not apply route daily    HYDROXYCHLOROQUINE (PLAQUENIL) 200 MG TABLET    Take 1 tablet by mouth 2 times daily    IBUPROFEN (ADVIL;MOTRIN) 800 MG TABLET    Take 800 mg by mouth every 6 hours as needed for Pain    KETOCONAZOLE (NIZORAL) 2 % SHAMPOO    Apply topically daily as needed. LANCETS MISC    1 each by Does not apply route daily    METFORMIN (GLUCOPHAGE-XR) 500 MG EXTENDED RELEASE TABLET    Take 2 tablets by mouth daily (with breakfast)    OMEPRAZOLE (PRILOSEC) 40 MG DELAYED RELEASE CAPSULE    Take 1 capsule by mouth every morning (before breakfast)    PREDNISONE (DELTASONE) 20 MG TABLET    1 tab daily for 5 days, then 1/2 tab daily for 5 days    PREDNISONE (DELTASONE) 5 MG TABLET    Take 1 tablet by mouth daily     ALLERGIES  No Known Allergies    Nursing notes reviewed by myself for past medical history, family history, social history, surgical history, current medications, and allergies. PHYSICAL EXAM  VITAL SIGNS: Triage VS:    ED Triage Vitals   Enc Vitals Group      BP       Pulse       Resp       Temp       Temp src       SpO2       Weight       Height       Head Circumference       Peak Flow       Pain Score       Pain Loc       Pain Edu? Excl. in [address]? Constitutional: Well developed, Well nourished, nontoxic appearing  HENT: Normocephalic, Atraumatic, Bilateral external ears normal, Oropharynx moist, No oral exudates, Nose normal.   Eyes: PERRL, EOMI, Conjunctiva normal, No discharge. No scleral icterus. Neck: Normal range of motion, No tenderness, Supple. Lymphatic: No lymphadenopathy noted. Cardiovascular: Normal heart rate, Normal rhythm, No murmurs, gallops or rubs. Thorax & Lungs: Normal breath sounds, No respiratory distress, No wheezing.   Abdomen: Soft, No tenderness, No masses, No pulsatile masses, No distention, Normal bowel sounds  Skin: Warm, Dry, macular erythema with scattered erythematous papules involving the face, scalp, neck, bilateral arms, bilateral hands, and bilateral ankles. She has some overlying excoriations to areas. The lesions are Nikolsky sign negative. Back: No tenderness, No CVA tenderness. Extremities: No edema, No tenderness, No cyanosis, Normal perfusion, No clubbing. Musculoskeletal: Good range of motion in all major joints as observed. No major deformities noted. Neurologic: Alert & oriented x 3,  No focal deficits noted. Psychiatric: Affect normal, Judgment normal, Mood normal.     RADIOLOGY  Labs Reviewed   CBC WITH AUTO DIFFERENTIAL - Abnormal; Notable for the following components:       Result Value    WBC 11.6 (*)     RBC 3.75 (*)     Hemoglobin 10.9 (*)     Hematocrit 33.1 (*)     Segs Relative 79.6 (*)     Lymphocytes % 14.1 (*)     Monocytes % 4.2 (*)     Immature Neutrophil % 0.5 (*)     All other components within normal limits   BASIC METABOLIC PANEL - Abnormal; Notable for the following components:    Creatinine 0.5 (*)     Glucose 201 (*)     All other components within normal limits   HEPATIC FUNCTION PANEL - Abnormal; Notable for the following components:    AST 12 (*)     All other components within normal limits   MAGNESIUM     I personally reviewed the images. The radiologist's interpretation reveals:  Last Imaging results   No orders to display       MEDS GIVEN IN ED:  Medications   famotidine (PEPCID) 20 mg in sodium chloride (PF) 10 mL injection (20 mg IntraVENous Given 7/28/22 0450)   hydrOXYzine (VISTARIL) injection 50 mg (50 mg IntraMUSCular Given 7/28/22 0500)     COURSE & MEDICAL DECISION MAKING  51-year-old female presents emergency department with complaints of worsening pruritic rash to face, scalp, hands, arms, and ankles. Initial vital signs here demonstrate elevated blood pressure 158/104.   On exam she does have macular erythema with scattered papules along the face and scalp with erythematous papules to neck, hands, arms, back, and ankles. These lesions are Nikolsky sign negative. She has no involvement of conjunctive or mucosal involvement of the oropharynx. Currently no signs of Fulton-Lonnie syndrome/TEN's. CBC is without eosinophilia. BMP shows mild hyperglycemia without anion gap. Patient had significant improvement here with Vistaril and Pepcid. Discharged with prescription for Vistaril to use in addition to her prednisone. Return precautions provided. Amount and/or Complexity of Data Reviewed  Clinical lab tests: reviewed  Decide to obtain previous medical records or to obtain history from someone other than the patient: yes       -  Patient seen and evaluated in the emergency department. -  Triage and nursing notes reviewed and incorporated. -  Old chart records reviewed and incorporated. -  Work-up included:  See above  -  Results discussed with patient. Appropriate PPE utilized as indicated for entire patient encounter? Time of Disposition: See timeline  ? New Prescriptions    HYDROXYZINE PAMOATE (VISTARIL) 50 MG CAPSULE    Take 1 capsule by mouth 3 times daily as needed for Itching     FINAL IMPRESSION  1. Drug rash        Electronically signed by:  Daysi Morrison DO, 7/28/2022         Daysi Morrison DO  07/28/22 [address] [place] [address],   07/28/22 [facility]

## 2022-08-22 ENCOUNTER — OFFICE VISIT (OUTPATIENT)
Dept: FAMILY MEDICINE CLINIC | Age: 47
End: 2022-08-22

## 2022-08-22 VITALS
TEMPERATURE: 97.7 F | WEIGHT: 189.8 LBS | HEIGHT: 64 IN | BODY MASS INDEX: 32.4 KG/M2 | SYSTOLIC BLOOD PRESSURE: 120 MMHG | DIASTOLIC BLOOD PRESSURE: 82 MMHG | HEART RATE: 115 BPM | OXYGEN SATURATION: 98 %

## 2022-08-22 DIAGNOSIS — H93.11 SUBJECTIVE TINNITUS, RIGHT: ICD-10-CM

## 2022-08-22 DIAGNOSIS — M19.90 CHRONIC INFLAMMATORY ARTHRITIS: ICD-10-CM

## 2022-08-22 DIAGNOSIS — T88.7XXD NON-DOSE-RELATED ADVERSE EFFECT OF MEDICATION, SUBSEQUENT ENCOUNTER: Primary | ICD-10-CM

## 2022-08-22 DIAGNOSIS — E09.9 STEROID-INDUCED DIABETES MELLITUS, SUBSEQUENT ENCOUNTER (HCC): ICD-10-CM

## 2022-08-22 DIAGNOSIS — R25.2 MUSCLE CRAMP: ICD-10-CM

## 2022-08-22 DIAGNOSIS — T38.0X5D STEROID-INDUCED DIABETES MELLITUS, SUBSEQUENT ENCOUNTER (HCC): ICD-10-CM

## 2022-08-22 PROBLEM — T88.7XXA NON-DOSE-RELATED ADVERSE REACTION TO MEDICATION: Status: ACTIVE | Noted: 2022-08-22

## 2022-08-22 PROCEDURE — 99214 OFFICE O/P EST MOD 30 MIN: CPT | Performed by: FAMILY MEDICINE

## 2022-08-22 RX ORDER — SULFASALAZINE 500 MG/1
500 TABLET ORAL 4 TIMES DAILY
Qty: 120 TABLET | Refills: 0 | Status: SHIPPED | OUTPATIENT
Start: 2022-08-22

## 2022-08-22 RX ORDER — PREDNISONE 20 MG/1
20 TABLET ORAL 2 TIMES DAILY
Qty: 8 TABLET | Refills: 0 | Status: SHIPPED | OUTPATIENT
Start: 2022-08-22 | End: 2022-08-26

## 2022-08-22 RX ORDER — CALCIUM CARBONATE 300MG(750)
1 TABLET,CHEWABLE ORAL DAILY
Qty: 30 TABLET | Refills: 1 | Status: SHIPPED | OUTPATIENT
Start: 2022-08-22

## 2022-08-22 RX ORDER — HYDROXYZINE PAMOATE 50 MG/1
50 CAPSULE ORAL 3 TIMES DAILY PRN
Qty: 30 CAPSULE | Refills: 0 | Status: SHIPPED | OUTPATIENT
Start: 2022-08-22 | End: 2022-09-01

## 2022-08-22 ASSESSMENT — ENCOUNTER SYMPTOMS
CONSTIPATION: 0
EYE PAIN: 0
ANAL BLEEDING: 0
BLOOD IN STOOL: 0
ABDOMINAL PAIN: 0
DIARRHEA: 0

## 2022-08-22 NOTE — ASSESSMENT & PLAN NOTE
High RF, ESR & CRP and 2020 anti_CCP was high but 2022 Anti-CCP was normal.  She is in the process of getting to a rheumatologist for more definitive care of her inflammatory arthritis with positive markers suggesting rheumatoid arthritis. She states that her insurance will not cover any biological medications.   I will put out a prescription of sulfasalazine which might provide some benefit between now and when she gets to the rheumatologist.

## 2022-08-22 NOTE — PROGRESS NOTES
Moves all extremities normally. Skin:     General: Skin is warm. Coloration: Skin is not jaundiced. Neurological:      Mental Status: Patient is alert. Psychiatric:         Behavior: Behavior normal.         Thought Content: Thought content normal.         Judgment: Judgment normal.        ASSESSMENT/PLAN:    1. Non-dose-related adverse effect of medication, subsequent encounter  Assessment & Plan:   I will treat the itching that she got after taking hydroxychloroquine with a few days of prednisone and with Vistaril. Orders:  -     hydrOXYzine pamoate (VISTARIL) 50 MG capsule; Take 1 capsule by mouth 3 times daily as needed for Itching, Disp-30 capsule, R-0Normal  -     predniSONE (DELTASONE) 20 MG tablet; Take 1 tablet by mouth 2 times daily for 4 days, Disp-8 tablet, R-0Normal  2. Chronic inflammatory arthritis  Assessment & Plan:   High RF, ESR & CRP and 2020 anti_CCP was high but 2022 Anti-CCP was normal.  She is in the process of getting to a rheumatologist for more definitive care of her inflammatory arthritis with positive markers suggesting rheumatoid arthritis. She states that her insurance will not cover any biological medications. I will put out a prescription of sulfasalazine which might provide some benefit between now and when she gets to the rheumatologist.  Orders:  -     sulfaSALAzine (AZULFIDINE) 500 MG tablet; Take 1 tablet by mouth 4 times daily, Disp-120 tablet, R-0Normal  3. Steroid-induced diabetes mellitus, subsequent encounter [facility])  Assessment & Plan: We will keep the amount of prednisone to a minimum due to steroid-induced diabetes. 4. Muscle cramp  Assessment & Plan:   Prescribed magnesium to help with muscle cramps. Orders:  -     Magnesium 400 MG TABS; Take 1 tablet by mouth daily, Disp-30 tablet, R-1Normal  5. Subjective tinnitus, right  Assessment & Plan:   She declined MRI. Since it started at same time as hives, hopefully it will resolve with the hives. Return in about 4 weeks (around 9/19/2022) for joint pain.    Chris Donald MD

## 2022-08-22 NOTE — ASSESSMENT & PLAN NOTE
I will treat the itching that she got after taking hydroxychloroquine with a few days of prednisone and with Vistaril.

## 2022-09-19 ENCOUNTER — OFFICE VISIT (OUTPATIENT)
Dept: FAMILY MEDICINE CLINIC | Age: 47
End: 2022-09-19

## 2022-09-19 VITALS
WEIGHT: 195 LBS | BODY MASS INDEX: 33.29 KG/M2 | HEIGHT: 64 IN | OXYGEN SATURATION: 97 % | SYSTOLIC BLOOD PRESSURE: 130 MMHG | HEART RATE: 102 BPM | DIASTOLIC BLOOD PRESSURE: 80 MMHG | TEMPERATURE: 96.6 F

## 2022-09-19 DIAGNOSIS — M19.90 CHRONIC INFLAMMATORY ARTHRITIS: Primary | ICD-10-CM

## 2022-09-19 DIAGNOSIS — T38.0X5A STEROID-INDUCED DIABETES MELLITUS, INITIAL ENCOUNTER (HCC): ICD-10-CM

## 2022-09-19 DIAGNOSIS — T39.395A NSAID INDUCED GASTRITIS: ICD-10-CM

## 2022-09-19 DIAGNOSIS — K29.60 NSAID INDUCED GASTRITIS: ICD-10-CM

## 2022-09-19 DIAGNOSIS — E09.9 STEROID-INDUCED DIABETES MELLITUS, INITIAL ENCOUNTER (HCC): ICD-10-CM

## 2022-09-19 PROCEDURE — 99214 OFFICE O/P EST MOD 30 MIN: CPT | Performed by: FAMILY MEDICINE

## 2022-09-19 RX ORDER — DICLOFENAC SODIUM 75 MG/1
75 TABLET, DELAYED RELEASE ORAL 2 TIMES DAILY
Qty: 60 TABLET | Refills: 3 | Status: SHIPPED | OUTPATIENT
Start: 2022-09-19

## 2022-09-19 RX ORDER — OMEPRAZOLE 40 MG/1
40 CAPSULE, DELAYED RELEASE ORAL
Qty: 90 CAPSULE | Refills: 0 | Status: SHIPPED | OUTPATIENT
Start: 2022-09-19

## 2022-09-19 RX ORDER — METFORMIN HYDROCHLORIDE 500 MG/1
1000 TABLET, EXTENDED RELEASE ORAL
Qty: 180 TABLET | Refills: 1 | Status: SHIPPED | OUTPATIENT
Start: 2022-09-19

## 2022-09-19 RX ORDER — PREDNISONE 10 MG/1
10 TABLET ORAL DAILY
Qty: 30 TABLET | Refills: 1 | Status: SHIPPED | OUTPATIENT
Start: 2022-09-19 | End: 2022-09-21 | Stop reason: SDUPTHER

## 2022-09-19 ASSESSMENT — ENCOUNTER SYMPTOMS
VOMITING: 0
COUGH: 0
NAUSEA: 0
DIARRHEA: 0
SHORTNESS OF BREATH: 0

## 2022-09-19 NOTE — ASSESSMENT & PLAN NOTE
She clearly has a chronic inflammatory arthritis. Currently she has had a flareup of arthritis and so I will prescribe prednisone. We will start at 10 mg a day but she will wean to 5 mg a day when possible. I placed a referral to rheumatologist.  Also I am prescribing diclofenac to help with the inflammation.

## 2022-09-19 NOTE — PROGRESS NOTES
9/19/22    Mark Milesjosafatbasil Fragosodioniciojalyn  1975    Jozef Bedolla is a 41 y.o. female who presents today for evaluation of:  Chief Complaint   Patient presents with    Follow-up     Joint pain     Arthritis : Does not think sulfasalazine helped at all. No steroids x 1 month. Miserable and feels very inflamed in back and like has swelling. Gastritis : uses omeprazole intermittenly. Steroid diabetes : taking metformin. Review of Systems   Respiratory:  Negative for cough and shortness of breath. Cardiovascular:  Negative for chest pain and palpitations. Gastrointestinal:  Negative for diarrhea, nausea and vomiting. Allergies   Allergen Reactions    Hydroxyquinolines Hives and Itching        OBJECTIVE    /80 (Site: Left Upper Arm, Position: Sitting, Cuff Size: Large Adult)   Pulse (!) 102   Temp (!) 96.6 °F (35.9 °C) (Infrared)   Ht 5' 4\" (1.626 m)   Wt 195 lb (88.5 kg)   SpO2 97%   BMI 33.47 kg/m²     Physical Exam   Constitutional:       General: Not in acute distress. Appearance: Normal appearance. Not ill-appearing. Eyes:      General: No scleral icterus. Cardiovascular:      Rate and Rhythm: Normal rate and regular rhythm. Heart sounds: No murmur heard. No friction rub. No gallop. Pulmonary:      Effort: Pulmonary effort is normal. No respiratory distress. Breath sounds: No wheezing, rhonchi or rales. Abdominal:      Palpations: Abdomen is soft. There is no mass. Tenderness: There is no abdominal tenderness. Musculoskeletal:     Moves all extremities normally. She has squeeze tenderness of her MCP J's and MTPJ's. She has puffiness of her hands. Skin:     General: Skin is warm. Coloration: Skin is not jaundiced. Neurological:      Mental Status: Patient is alert. Psychiatric:         Behavior: Behavior normal.         Thought Content:  Thought content normal.         Judgment: Judgment normal.  Feet:      Right foot:      Skin integrity: No ulcer or skin breakdown. No deformity or bunion. Left foot:      Skin integrity: No ulcer or skin breakdown. No deformity or bunion. Pulses:           Dorsalis pedis pulses are 2+ on the right side and 2+ on the left side. Posterior tibial pulses are 2+ on the right side and 2+ on the left side. Sensation:     Right foot: Normal  monofilament sense on 5 toes, medial and lateral plantar MTPJ area, and heel. Normal  vibratory sense right great toe. Left foot:  Normal   monofilament sense on 5 toes, medial and lateral plantar MTPJ area, and heel. Normal  vibratory sense left great toe. ASSESSMENT/PLAN:    1. Chronic inflammatory arthritis  Assessment & Plan:   She clearly has a chronic inflammatory arthritis. Currently she has had a flareup of arthritis and so I will prescribe prednisone. We will start at 10 mg a day but she will wean to 5 mg a day when possible. I placed a referral to rheumatologist.  Also I am prescribing diclofenac to help with the inflammation. Orders:  -     Rhiannon Frye MD, Rheumatology, [place]  -     predniSONE (DELTASONE) 10 MG tablet; Take 1 tablet by mouth daily for 5 days, Disp-30 tablet, R-1Normal  -     diclofenac (VOLTAREN) 75 MG EC tablet; Take 1 tablet by mouth 2 times daily, Disp-60 tablet, R-3Normal  2. Steroid-induced diabetes mellitus, initial encounter [facility])  Assessment & Plan:   Continue metformin for steroid-induced gastritis. Orders:  -     metFORMIN (GLUCOPHAGE-XR) 500 MG extended release tablet; Take 2 tablets by mouth daily (with breakfast), Disp-180 tablet, R-1Normal  -      DIABETES FOOT EXAM  3. NSAID induced gastritis  Assessment & Plan:   Warned her that the diclofenac may worsen her gastritis. I told her to take omeprazole more liberally if needed. Orders:  -     omeprazole (PRILOSEC) 40 MG delayed release capsule;  Take 1 capsule by mouth every morning (before breakfast), Disp-90 capsule, R-0Normal    It is safe to take acetaminophen up to a total dose of 3000 mg spread through the day. Be sure to include acetaminophen in all products since acetaminophen can be in cold preparations and in some opioid pain medications. Counseling provided for:  Healthy eating - Avoid sugar and other refined carbohydrates. No follow-ups on file.    Melisa Deleon MD

## 2022-09-19 NOTE — ASSESSMENT & PLAN NOTE
Warned her that the diclofenac may worsen her gastritis. I told her to take omeprazole more liberally if needed.

## 2022-09-21 ENCOUNTER — TELEPHONE (OUTPATIENT)
Dept: FAMILY MEDICINE CLINIC | Age: 47
End: 2022-09-21

## 2022-09-21 DIAGNOSIS — M19.90 CHRONIC INFLAMMATORY ARTHRITIS: ICD-10-CM

## 2022-09-21 RX ORDER — PREDNISONE 10 MG/1
5-10 TABLET ORAL DAILY
Qty: 30 TABLET | Refills: 1 | Status: SHIPPED | OUTPATIENT
Start: 2022-09-21 | End: 2022-10-26

## 2022-09-21 NOTE — TELEPHONE ENCOUNTER
1 [employer] called for clarification on the Prednisone. It states take 1 for 5 days but quantity is 30 with one refill.

## 2022-10-15 NOTE — PROGRESS NOTES
RHEUMATOLOGY NEW PATIENT VISIT    10/17/2022      Patient Name: Chu Ascencio  : 1975  Medical Record: [id number]      CHIEF COMPLAINT  Inflammatory Arthritis     Pertinent Problems  Gastritis  Diabetes Mellitus   Scalp psoriasis    HISTORY OF PRESENT ILLNESS    Mark Guevara is a 41 y.o. female with hx of seropositive RA who was referred by Babak Andujar for above problems since 2020 which began with left shoulder pain. Pain continued to increase associated with stiffness which was worse in the morning, at that time pain had worsened to neck, shoulder pain. One day she experienced profound generalized body stiffness. Bilateral hand swelling and puffiness. She responded significantly to prednisone which helped her joints (though not her psoriasis very much) and she establised care with outside rheumatologist who started MTX 12.5mg that caused hair loss. Dose was increased to 17.5mg, pain did not improve and was changed due to hair loss. Took Humira for almost 6 weeks that caused abdominal hives. Medication was changed to [place] which she took for 3 months that caused elevated liver enzymes then it was held. Never tried SSZ. In 2021, her RA flared up. At the time, she was no longer seeing her rheumatologist. She controlled aches and pains with Ibuprofen prior to 2021. She has been getting prednisone refills from PCP since 2021. HCQ was started in 2022 took for 5 days and broke out into hives. Currently she is on prednisone 5-10 mg daily and is currently not on any biologics. 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 Functional status: good     Other rheumatologic symptoms :  Psoriasis + Denies chest pain, SOB, fever, rash, oral/nasal ulcers, change in mental status, sicca symptoms, Muscle pain, muscle weakness, raynaud's, puffy fingers or skin thickening. History of miscarriages, blood clots, dactylitis, uveitis, enthesitis, psoriasis, buttock pain, change in BM    Risk factors: Never smoker, EtOH, obesity, recreational drug use, recent travel/ infection, sexual hx. Son got diagnosed with lupus       Current rheum meds: prednisone 5-10mg daily started 1/2022    Past rheum meds: HCQ, MTX, Humira     No flowsheet data found.     REVIEW OF SYSTEMS     Constitutional:  Denies fever or chills, decreased appetite, or weight loss   Eyes:  Denies change in visual acuity or eye dryness or irritation  HENT:  Denies dry mouth or oral ulcers  Respiratory:  Denies cough or shortness of breath   Cardiovascular:  Denies chest pain or edema   GI:  Denies abdominal pain, nausea, vomiting, bloody stools or diarrhea   :  Denies dysuria or hematuria  Musculoskeletal:  See HPI  Integument:  PsO+  Neurologic:  Denies headache, focal weakness or sensory changes   Endocrine:  Denies polyuria or polydipsia   Lymphatic:  Denies swollen glands   Psychiatric:  Denies depression or anxiety       PROBLEM LIST    Patient Active Problem List   Diagnosis    Abnormal uterine bleeding    NSAID induced gastritis    Chronic inflammatory arthritis    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) ([facility])    Muscle cramp    Subjective tinnitus, right    Non-dose-related adverse reaction to medication       MEDICATIONS    Current Outpatient Medications   Medication Sig Dispense Refill    predniSONE (DELTASONE) 10 MG tablet Take 0.5-1 tablets by mouth daily 30 tablet 1    metFORMIN (GLUCOPHAGE-XR) 500 MG extended release tablet Take 2 tablets by mouth daily (with breakfast) 180 tablet 1    omeprazole (PRILOSEC) 40 MG delayed release capsule Take 1 capsule by mouth every morning (before breakfast) 90 capsule 0    ibuprofen (ADVIL;MOTRIN) 800 MG tablet Take 800 mg by mouth every 6 hours as needed for Pain      diclofenac (VOLTAREN) 75 MG EC tablet Take 1 tablet by mouth 2 times daily (Patient not taking: Reported on 10/17/2022) 60 tablet 3    Magnesium 400 MG TABS Take 1 tablet by mouth daily (Patient not taking: Reported on 10/17/2022) 30 tablet 1    sulfaSALAzine (AZULFIDINE) 500 MG tablet Take 1 tablet by mouth 4 times daily (Patient not taking: Reported on 10/17/2022) 120 tablet 0    blood glucose monitor strips 1 strip by Other route daily as needed for Other (monitoring blood sugar) Test once a day & as needed for symptoms of irregular blood glucose. Dispense sufficient amount for indicated testing frequency plus additional to accommodate PRN testing needs. (Patient not taking: Reported on 10/17/2022) 100 strip 3    Lancets MISC 1 each by Does not apply route daily (Patient not taking: Reported on 10/17/2022) 100 each 5    glucose monitoring (FREESTYLE FREEDOM) kit 1 kit by Does not apply route daily (Patient not taking: Reported on 10/17/2022) 1 kit 0     No current facility-administered medications for this visit.        ALLERGIES    Allergies   Allergen Reactions    Hydroxyquinolines Hives and Itching       PAST MEDICAL HISTORY      Past Medical History:   Diagnosis Date    Abnormal uterine bleeding     pt is scheduled for thermachoice ablation 7/24/2013\"heavy bleeding for almost a year- getting worse\"\"have to wear a pad all the time- heavy to light but some kind of drainage every day\"    Anemia     Rheumatoid arthritis ([facility] [address]          SOCIAL HISTORY     Social History     Socioeconomic History    Marital status:    Tobacco Use    Smoking status: Never    Smokeless tobacco: Never   Vaping Use    Vaping Use: Never used   Substance and Sexual Activity    Alcohol use: No    Drug use: No    Sexual activity: Yes Social Determinants of Health     Physical Activity: Unknown    Days of Exercise per Week: Patient refused   Intimate Partner Violence: Not At Risk    Fear of Current or Ex-Partner: No    Emotionally Abused: No    Physically Abused: No    Sexually Abused: No         FAMILY HISTORY     Family History   Problem Relation Age of Onset    Lupus Brother          PHYSICAL EXAM     Wt Readings from Last 3 Encounters:   10/17/22 186 lb 6.4 oz (84.6 kg)   09/19/22 195 lb (88.5 kg)   08/22/22 189 lb 12.8 oz (86.1 kg)     Temp Readings from Last 3 Encounters:   09/19/22 (!) 96.6 °F (35.9 °C) (Infrared)   08/22/22 97.7 °F (36.5 °C) (Infrared)   07/28/22 98.7 °F (37.1 °C) (Oral)     BP Readings from Last 3 Encounters:   10/17/22 (!) 160/90   09/19/22 130/80   08/22/22 120/82     Pulse Readings from Last 3 Encounters:   10/17/22 79   09/19/22 (!) 102   08/22/22 (!) 115       General appearance:  Alert and oriented, NAD, well developed   HEENT: EOMI, no scleral injection, moist mucous membranes, no oral ulcers, normal hearing, no cartilage inflammation  Neck: Trachea midline, no masses  Lymph: no LAD  Lungs: CTAB, no rales  Heart: regular rate and rhythm, S1, S2 normal, no murmur, no lower extremity edema  Abdomen: Soft, ND, NT. + BS. Extremities: atraumatic, no cyanosis or edema. Neurologic: CN 2-12 grossly intact. Skin: Psoriasis on scalp and belly button+ , hypopigmented rash, nail pitting+  scattered on bilateral forearm, warm and dry, no telangiectasias, no digital pitting, no sclerodactyly, no rheumatoid nodules, no livedo  MSK: 5/5 strength of the proximal and distal muscles of the upper and lower extremities.    WRIST: Bilateral Synovitis +   HANDS: left 2nd and 5th PIP synovitis,  good ROM,   Elbow: No synovitis, good ROM,   Shoulder:good ROM,   Knee: no effusion, good ROM,   Ankle:synovitis+ good ROM,   FEET: right pos forefoot squeeze test    Spine:  Normal range of motion; no tender points, no obvious deformities. Neuro:  Alert & oriented x 3, normal motor function, normal sensory function, no focal deficits noted . Muscle strength: 4/4 in bilateral upper and lower extremities. Psychiatric: Mood and affect are appropriate, recent and remote memory normal,          LABS AND IMAGING  Available labs were reviewed and discussed with patient   7/11/2022  RF, 33, H   ESR 49 H   CRP 13.7 H   CCP neg   WBC 11.6, H   Hgb Normocytic anemia   Plt wnl   LFT wnl   RFP wnl   Acute Hep Panel wnl, last checked in 2020  Quantiferon  neg 2021    Assessment     Patient is a 42 yo f with prior hx of RA and PsO presenting with assymetrical joint pain and swelling. Her RF +, she has a hx of poor tolerance to meds. MTX caused hair loss, HCQ caused hypopigemented/ hyperpigmented skin rash and nail changes. Humir caused Hives. Steroids is currently helping joints but not PsO rash. I am concerned about Psoriatic arthritis given asymetrical large joint involving in left shoulders and right ankle, hx od PsO and nail pitting. [place] was well tolerated and significantly helped her joints and PsO. Today she reports that her insurance will not approve this medication. 1. Inflammatory arthritis  - Quantiferon, Incubated; Future  - XR WRIST RIGHT (MIN 3 VIEWS); Future  - XR WRIST LEFT (MIN 3 VIEWS); Future  - XR HAND RIGHT (MIN 3 VIEWS); Future  - XR HAND LEFT (MIN 3 VIEWS); Future  - XR CHEST (2 VW); Future  - Hepatitis Panel, Acute; Future  - C-Reactive Protein; Future  - Sedimentation Rate; Future  - Renal Function Panel; Future  - CBC; Future  - Vitamin D 25 Hydroxy; Future  - XR FOOT LEFT (MIN 3 VIEWS); Future  - XR FOOT RIGHT (MIN 3 VIEWS); Future  - DEXA BONE DENSITY AXIAL SKELETON; Future  - Hepatic Function Panel; Future  - LIPID PANEL; Future    2. Encounter for other specified special examinations  - Hepatitis Panel, Acute; Future    3. Obesity (BMI 30.0-34.9)  - DEXA BONE DENSITY AXIAL SKELETON; Future    4.  High risk medication use  TOFACITINIB  Tofacitinib is an immunosuppressive medication that is taken orally twice daily. The potential side effects of tofacitinib were discussed with the patient. There is a risk for serious infections and worsening of any current infections. The medication should be held if symptoms of fever, chills, cough, shortness of breath or burning on urination occur. Certain patients with a history of stomach ulcers or those taking nonsteroidal anti-inflammatory medications may be at increased risk of tears in the stomach or intestines. Tofacitinib may also increase the risk of developing certain types of cancers. Routine laboratory monitoring is required as the mediation may also cause changes in blood cell counts. - LIPID PANEL; Future    5. Current chronic use of systemic steroids  - DEXA BONE DENSITY AXIAL SKELETON; Future  - Daily calcium and Vit D was advised     6. NSAID long-term use  - Renal Function Panel; Future       Patient Instructions  Complete ordered labs and get imaging done  Okay to use diclofenac gel over painful hands and to take Ibuprofen 400mg twice daily as needed   [facility] literature was handed to you   We will discuss results at next visit  RTC in 2 weeks       -  The patient indicates understanding of these issues and agrees with the plan.     I spent 60 minutes on the date of service, preparing to see the patient (eg, review of tests), obtaining and/or reviewing separately obtained history, counseling and educating the family/caregiver, ordering medications, tests, or procedures, referring and communicating with other health care professionals, documenting clinical information in the electronic or other health record, care coordination (not separately reported)      Dee Pearson MD

## 2022-10-17 ENCOUNTER — OFFICE VISIT (OUTPATIENT)
Dept: RHEUMATOLOGY | Age: 47
End: 2022-10-17

## 2022-10-17 VITALS
SYSTOLIC BLOOD PRESSURE: 160 MMHG | OXYGEN SATURATION: 98 % | BODY MASS INDEX: 32 KG/M2 | DIASTOLIC BLOOD PRESSURE: 90 MMHG | WEIGHT: 186.4 LBS | HEART RATE: 79 BPM

## 2022-10-17 DIAGNOSIS — M19.90 INFLAMMATORY ARTHRITIS: Primary | ICD-10-CM

## 2022-10-17 DIAGNOSIS — Z79.52 CURRENT CHRONIC USE OF SYSTEMIC STEROIDS: ICD-10-CM

## 2022-10-17 DIAGNOSIS — E66.9 OBESITY (BMI 30.0-34.9): ICD-10-CM

## 2022-10-17 DIAGNOSIS — Z79.899 HIGH RISK MEDICATION USE: ICD-10-CM

## 2022-10-17 DIAGNOSIS — Z79.1 NSAID LONG-TERM USE: ICD-10-CM

## 2022-10-17 DIAGNOSIS — Z01.89 ENCOUNTER FOR OTHER SPECIFIED SPECIAL EXAMINATIONS: ICD-10-CM

## 2022-10-17 NOTE — PATIENT INSTRUCTIONS
Complete ordered labs and get imaging done  Okay to use diclofenac gel over painful hands and to take Ibuprofen 400mg twice daily as needed   [facility] literature was handed to you   We will discuss results at next visit  RTC in 2 weeks

## 2022-10-18 ENCOUNTER — TELEPHONE (OUTPATIENT)
Dept: RHEUMATOLOGY | Age: 47
End: 2022-10-18

## 2022-10-18 NOTE — TELEPHONE ENCOUNTER
Per Josette Islas Provider:    Provider will not be in office OCT 28th. She did open the schedule for OCT 21st. Patient needs to reschedule either for the 21st or another day. Left detailed message on patients # [phone] to return our call.

## 2022-10-22 ENCOUNTER — HOSPITAL ENCOUNTER (OUTPATIENT)
Age: 47
Discharge: HOME | End: 2022-10-22

## 2022-10-22 ENCOUNTER — HOSPITAL ENCOUNTER (OUTPATIENT)
Dept: GENERAL RADIOLOGY | Age: 47
Discharge: HOME | End: 2022-10-22

## 2022-10-22 DIAGNOSIS — M19.90 INFLAMMATORY ARTHRITIS: ICD-10-CM

## 2022-10-22 DIAGNOSIS — Z79.899 HIGH RISK MEDICATION USE: ICD-10-CM

## 2022-10-22 DIAGNOSIS — Z79.1 NSAID LONG-TERM USE: ICD-10-CM

## 2022-10-22 DIAGNOSIS — Z01.89 ENCOUNTER FOR OTHER SPECIFIED SPECIAL EXAMINATIONS: ICD-10-CM

## 2022-10-22 LAB
ALBUMIN SERPL-MCNC: 4.2 GM/DL (ref 3.4–5)
ALBUMIN SERPL-MCNC: 4.2 GM/DL (ref 3.4–5)
ALP BLD-CCNC: 75 IU/L (ref 40–129)
ALT SERPL-CCNC: 28 U/L (ref 10–40)
ANION GAP SERPL CALCULATED.3IONS-SCNC: 11 MMOL/L (ref 4–16)
AST SERPL-CCNC: 15 IU/L (ref 15–37)
BILIRUB SERPL-MCNC: 0.3 MG/DL (ref 0–1)
BILIRUBIN DIRECT: 0.2 MG/DL (ref 0–0.3)
BILIRUBIN, INDIRECT: 0.1 MG/DL (ref 0–0.7)
BUN BLDV-MCNC: 15 MG/DL (ref 6–23)
CALCIUM SERPL-MCNC: 9.1 MG/DL (ref 8.3–10.6)
CHLORIDE BLD-SCNC: 100 MMOL/L (ref 99–110)
CHOLESTEROL: 215 MG/DL
CO2: 24 MMOL/L (ref 21–32)
CREAT SERPL-MCNC: 0.6 MG/DL (ref 0.6–1.1)
ERYTHROCYTE SEDIMENTATION RATE: 39 MM/HR (ref 0–20)
GFR SERPL CREATININE-BSD FRML MDRD: >60 ML/MIN/1.73M2
GLUCOSE BLD-MCNC: 156 MG/DL (ref 70–99)
HAV IGM SER IA-ACNC: NON REACTIVE
HCT VFR BLD CALC: 35.2 % (ref 37–47)
HDLC SERPL-MCNC: 72 MG/DL
HEMOGLOBIN: 11.4 GM/DL (ref 12.5–16)
HEPATITIS B CORE IGM ANTIBODY: NON REACTIVE
HEPATITIS B SURFACE ANTIGEN: NON REACTIVE
HEPATITIS C ANTIBODY: NON REACTIVE
HIGH SENSITIVE C-REACTIVE PROTEIN: 9.5 MG/L (ref 0–5)
LDL CHOLESTEROL CALCULATED: 97 MG/DL
MCH RBC QN AUTO: 28.1 PG (ref 27–31)
MCHC RBC AUTO-ENTMCNC: 32.4 % (ref 32–36)
MCV RBC AUTO: 86.7 FL (ref 78–100)
PDW BLD-RTO: 12.9 % (ref 11.7–14.9)
PHOSPHORUS: 3.8 MG/DL (ref 2.5–4.9)
PLATELET # BLD: 311 K/CU MM (ref 140–440)
PMV BLD AUTO: 9.5 FL (ref 7.5–11.1)
POTASSIUM SERPL-SCNC: 4.1 MMOL/L (ref 3.5–5.1)
RBC # BLD: 4.06 M/CU MM (ref 4.2–5.4)
SODIUM BLD-SCNC: 135 MMOL/L (ref 135–145)
TOTAL PROTEIN: 6.8 GM/DL (ref 6.4–8.2)
TRIGL SERPL-MCNC: 232 MG/DL
VITAMIN D 25-HYDROXY: 27.57 NG/ML
WBC # BLD: 8.7 K/CU MM (ref 4–10.5)

## 2022-10-22 PROCEDURE — 73130 X-RAY EXAM OF HAND: CPT

## 2022-10-22 PROCEDURE — 82306 VITAMIN D 25 HYDROXY: CPT

## 2022-10-22 PROCEDURE — 73110 X-RAY EXAM OF WRIST: CPT

## 2022-10-22 PROCEDURE — 36415 COLL VENOUS BLD VENIPUNCTURE: CPT

## 2022-10-22 PROCEDURE — 73630 X-RAY EXAM OF FOOT: CPT

## 2022-10-22 PROCEDURE — 84100 ASSAY OF PHOSPHORUS: CPT

## 2022-10-22 PROCEDURE — 85652 RBC SED RATE AUTOMATED: CPT

## 2022-10-22 PROCEDURE — 71046 X-RAY EXAM CHEST 2 VIEWS: CPT

## 2022-10-22 PROCEDURE — 82248 BILIRUBIN DIRECT: CPT

## 2022-10-22 PROCEDURE — 80074 ACUTE HEPATITIS PANEL: CPT

## 2022-10-22 PROCEDURE — 86141 C-REACTIVE PROTEIN HS: CPT

## 2022-10-22 PROCEDURE — 80053 COMPREHEN METABOLIC PANEL: CPT

## 2022-10-22 PROCEDURE — 85027 COMPLETE CBC AUTOMATED: CPT

## 2022-10-22 PROCEDURE — 86480 TB TEST CELL IMMUN MEASURE: CPT

## 2022-10-22 PROCEDURE — 80061 LIPID PANEL: CPT

## 2022-10-23 NOTE — PROGRESS NOTES
RHEUMATOLOGY FOLLOW UP VISIT    10/26/2022      Patient Name: Lluvia Friend  : 1975  Medical Record: [id number]      CHIEF COMPLAINT  PsA- Nail changes, hx of PSO, asymmetrical arthritis   RF+     Pertinent Problems  Gastritis  Diabetes Mellitus   Scalp psoriasis    HISTORY OF PRESENT ILLNESS    Mark Hernandez is a 41 y.o. female established since 10/17/2022  Problem began in 2020 which began with left shoulder pain. Pain continued to increase associated with stiffness which was worse in the morning, at that time pain had worsened to neck, shoulder pain. One day she experienced profound generalized body stiffness. Bilateral hand swelling and puffiness. She responded significantly to prednisone which helped her joints (though not her psoriasis very much) and she establised care with outside rheumatologist who started MTX 12.5mg that caused hair loss. Dose was increased to 17.5mg weekly, pain did not improve and medication was switched 2/2 worsening hair loss. Took Humira for almost 6 weeks that caused abdominal hives. Started Roberth Duy which helped her joints significantly, she took for 3 months that caused elevated liver enzymes then it was held. In 2021, her RA flared up. At the time, she was no longer seeing her rheumatologist. She controlled aches and pains with Ibuprofen prior to 2021. She has been getting prednisone refills from PCP since 2021. HCQ was started in 2022 took for 5 days and broke out into hives. Currently she is on prednisone 5-10 mg daily and is currently not on any biologics. 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   We engaged [place] representative for assistance   I have reviewed her result performed during our previous encounter   Xray of the wrists, hands and feet are unremarkable, there are no soft tissue changes, periosteal changes, joint space narrowing or erosions   Labs showed mild anemia  Elevated CRP and ESR   Hepatitis Panel neg   Quant in process    Disease progression:   Today, patient describes joint pain,in bilateral hands   There is stiffness as well, lasting a few hours   There is finger and ankle swelling  Pain level today: 2/10, the cold weather makes her pain worse   She has been taking prednisone 5mg in the daytime and Ibuprofen 800mg at night. She feels prednisone works better and does not cause PsO flares      Other rheumatologic symptoms :  Psoriasis +     Current rheum meds: prednisone 5-10mg daily started 1/2022 and Ibuprofen 800mg     Past rheum meds: HCQ, MTX, Humira     No flowsheet data found.     REVIEW OF SYSTEMS     Constitutional:  Denies fever or chills, decreased appetite, or weight loss   Eyes:  Denies change in visual acuity or eye dryness or irritation  HENT:  Denies dry mouth or oral ulcers  Respiratory:  Denies cough or shortness of breath   Cardiovascular:  Denies chest pain or edema   GI:  Denies abdominal pain, nausea, vomiting, bloody stools or diarrhea   :  Denies dysuria or hematuria  Musculoskeletal:  See HPI  Integument:  PsO+  Neurologic:  Denies headache, focal weakness or sensory changes   Endocrine:  Denies polyuria or polydipsia   Lymphatic:  Denies swollen glands   Psychiatric:  Denies depression or anxiety       PROBLEM LIST    Patient Active Problem List   Diagnosis    Abnormal uterine bleeding    NSAID induced gastritis    Chronic inflammatory arthritis    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)    Muscle cramp    Subjective tinnitus, right    Non-dose-related adverse reaction to medication       MEDICATIONS    Current Outpatient Medications   Medication Sig Dispense Refill    predniSONE (DELTASONE) 10 MG tablet Take 0.5-1 tablets by mouth daily 30 tablet 1    metFORMIN (GLUCOPHAGE-XR) 500 MG extended release tablet Take 2 tablets by mouth daily (with breakfast) 180 tablet 1    diclofenac (VOLTAREN) 75 MG EC tablet Take 1 tablet by mouth 2 times daily (Patient not taking: Reported on 10/17/2022) 60 tablet 3    omeprazole (PRILOSEC) 40 MG delayed release capsule Take 1 capsule by mouth every morning (before breakfast) 90 capsule 0    Magnesium 400 MG TABS Take 1 tablet by mouth daily (Patient not taking: Reported on 10/17/2022) 30 tablet 1    sulfaSALAzine (AZULFIDINE) 500 MG tablet Take 1 tablet by mouth 4 times daily (Patient not taking: Reported on 10/17/2022) 120 tablet 0    blood glucose monitor strips 1 strip by Other route daily as needed for Other (monitoring blood sugar) Test once a day & as needed for symptoms of irregular blood glucose. Dispense sufficient amount for indicated testing frequency plus additional to accommodate PRN testing needs. (Patient not taking: Reported on 10/17/2022) 100 strip 3    Lancets MISC 1 each by Does not apply route daily (Patient not taking: Reported on 10/17/2022) 100 each 5    glucose monitoring (FREESTYLE FREEDOM) kit 1 kit by Does not apply route daily (Patient not taking: Reported on 10/17/2022) 1 kit 0    ibuprofen (ADVIL;MOTRIN) 800 MG tablet Take 800 mg by mouth every 6 hours as needed for Pain       No current facility-administered medications for this visit.        ALLERGIES    Allergies   Allergen Reactions    Hydroxyquinolines Hives and Itching       PAST MEDICAL HISTORY      Past Medical History:   Diagnosis Date    Abnormal uterine bleeding     pt is scheduled for thermachoice ablation 7/24/2013\"heavy bleeding for almost a year- getting worse\"\"have to wear a pad all the time- heavy to light but some kind of drainage every day\"    Anemia     Rheumatoid arthritis ([address]          SOCIAL HISTORY     Social History     Socioeconomic History    Marital status:    Tobacco Use    Smoking status: Never    Smokeless tobacco: Never   Vaping Use Vaping Use: Never used   Substance and Sexual Activity    Alcohol use: No    Drug use: No    Sexual activity: Yes     Social Determinants of Health     Physical Activity: Unknown    Days of Exercise per Week: Patient refused   Intimate Partner Violence: Not At Risk    Fear of Current or Ex-Partner: No    Emotionally Abused: No    Physically Abused: No    Sexually Abused: No         FAMILY HISTORY     Family History   Problem Relation Age of Onset    Lupus Brother          PHYSICAL EXAM     Wt Readings from Last 3 Encounters:   10/17/22 186 lb 6.4 oz (84.6 kg)   09/19/22 195 lb (88.5 kg)   08/22/22 189 lb 12.8 oz (86.1 kg)     Temp Readings from Last 3 Encounters:   09/19/22 (!) 96.6 °F (35.9 °C) (Infrared)   08/22/22 97.7 °F (36.5 °C) (Infrared)   07/28/22 98.7 °F (37.1 °C) (Oral)     BP Readings from Last 3 Encounters:   10/17/22 (!) 160/90   09/19/22 130/80   08/22/22 120/82     Pulse Readings from Last 3 Encounters:   10/17/22 79   09/19/22 (!) 102   08/22/22 (!) 115       General appearance:  Alert and oriented, NAD, well developed   HEENT: EOMI, no scleral injection, moist mucous membranes, no oral ulcers, normal hearing, no cartilage inflammation  Neck: Trachea midline, no masses  Lymph: no LAD  Lungs: CTAB, no rales  Heart: regular rate and rhythm, S1, S2 normal, no murmur, no lower extremity edema  Abdomen: Soft, ND, NT. + BS. Extremities: atraumatic, no cyanosis or edema. Neurologic: CN 2-12 grossly intact. Skin: Psoriasis on scalp and belly button+ , hypopigmented rash, nail pitting+  scattered on bilateral forearm, warm and dry, no telangiectasias, no digital pitting, no sclerodactyly, no rheumatoid nodules, no livedo  MSK: 5/5 strength of the proximal and distal muscles of the upper and lower extremities.    WRIST: Bilateral Synovitis +   HANDS: left 2nd and 5th PIP synovitis,  good ROM,   Elbow: No synovitis, good ROM,   Shoulder:good ROM,   Knee: no effusion, good ROM,   Ankle:synovitis+ good ROM, FEET: right pos forefoot squeeze test    Spine:  Normal range of motion; no tender points, no obvious deformities. Neuro:  Alert & oriented x 3, normal motor function, normal sensory function, no focal deficits noted . Muscle strength: 4/4 in bilateral upper and lower extremities. Psychiatric: Mood and affect are appropriate, recent and remote memory normal,    LABS AND IMAGING  I have reviewed her result performed during our previous encounter   Xray of the wrists, hands and feet are unremarkable, there are no soft tissue changes, periosteal changes, joint space narrowing or erosions   Labs showed mild anemia  Elevated CRP and ESR   Hepatitis Panel neg   Quant in process  Rf pos  CCP neg     Assessment     Patient is a 40 yo f with inflammatory arthritis more suggestive of PsA though RF is Positive. This can be seen in few cases of PsA. She has a hx of poor tolerance to meds. MTX caused hair loss, HCQ caused hypopigmented/ hyperpigmented skin rash and nail changes. Humira caused Hives. Steroids is currently helping joints but not PsO rash. Features of PsA include asymetrical large joint involving in left shoulders and right ankle, hx of PsO and nail changes. Luis Hernandez was well tolerated and significantly helped her joints and PsO. She is willing to try this medication again. We are working with Luis Hernandez rep to enroll her in the discount program. Yetta Gone is pending, we will review results before giving her free samples in the meantime. 1. PsA  2. PsO  - Stop Ibuprofen, increase prednisone to 5mg bid     3. Obesity (BMI 30.0-34. 9)  CV RISK: 10/23/22  Discussed with the patient about increased CV risk associated with PsA  I have reviewed the comorbidities including  obesity/ DM /HTN/ HLD /smoking.   There is no height or weight on file to calculate BMI.;   Lab Results   Component Value Date    LDLCALC 97 10/22/2022   - Advised to follow up with PCP/Cardiology for primary cardiovascular disease prevention and for yearly lipid panel check. - Encouraged life style modification including exercise, low fat diet and weight loss. - hgbA1c with next lab draw      4. High risk medication use  TOFACITINIB  Tofacitinib is an immunosuppressive medication that is taken orally twice daily. The potential side effects of tofacitinib were discussed with the patient. There is a risk for serious infections and worsening of any current infections. The medication should be held if symptoms of fever, chills, cough, shortness of breath or burning on urination occur. Certain patients with a history of stomach ulcers or those taking nonsteroidal anti-inflammatory medications may be at increased risk of tears in the stomach or intestines. Tofacitinib may also increase the risk of developing certain types of cancers. Routine laboratory monitoring is required as the mediation may also cause changes in blood cell counts. - LIPID PANEL; Future    5. Current chronic use of systemic steroids  - DEXA BONE DENSITY AXIAL SKELETON; Future  - Daily calcium and Vit D was advised     Patient Instructions  Stop Ibuprofen and continue prednisone 5mg twice a day   We will await your TB test to proceed with Tobias Cota authorization   If Marquita Em is not approved, we will try Etanercept. Please see attched info about this medication   RTC in 4 weeks       -  The patient indicates understanding of these issues and agrees with the plan. I spent 20 minutes on the date of service, preparing to see the patient (eg, review of tests), obtaining and/or reviewing separately obtained history, counseling and educating the family/caregiver, ordering medications, tests, or procedures and documenting clinical information in the electronic or other health record, care coordination (not separately reported)      Alec Batista MD  Portions of this note was copied forward from the note written by me on 10/17/2022.  I have reviewed and updated the history, physical exam, data, assessment and plan of the note so that it reflects the current evaluation and management of the patient. Addendum 12/9  Notified by Magalie taylor that patient has no insurance coverage for biologics, we will prescribe SSZ and patient will be notified. Sulfasalazine   I explained the rationale for this medication in this disease process. I also reviewed potential SSZ side effects. These include but not limited to rash, anorexia, headache, nausea, vomiting, diarrhea, gastric distress, leukopenia, hemolytic anemia, macrocytosis, liver failure and, rarely, megaloblastic anemia. This will require q4 week monitoring of labs x 3 months, then q12 weeks thereafter for potential toxicity.     Aj Rascon MD

## 2022-10-26 ENCOUNTER — OFFICE VISIT (OUTPATIENT)
Dept: RHEUMATOLOGY | Age: 47
End: 2022-10-26
Payer: COMMERCIAL

## 2022-10-26 VITALS
SYSTOLIC BLOOD PRESSURE: 125 MMHG | HEART RATE: 92 BPM | BODY MASS INDEX: 32.96 KG/M2 | OXYGEN SATURATION: 98 % | WEIGHT: 192 LBS | DIASTOLIC BLOOD PRESSURE: 80 MMHG

## 2022-10-26 DIAGNOSIS — L40.50 PSORIATIC ARTHRITIS (HCC): Primary | ICD-10-CM

## 2022-10-26 DIAGNOSIS — Z79.52 CURRENT CHRONIC USE OF SYSTEMIC STEROIDS: ICD-10-CM

## 2022-10-26 DIAGNOSIS — Z79.899 HIGH RISK MEDICATION USE: ICD-10-CM

## 2022-10-26 DIAGNOSIS — M19.90 CHRONIC INFLAMMATORY ARTHRITIS: ICD-10-CM

## 2022-10-26 RX ORDER — PREDNISONE 1 MG/1
5 TABLET ORAL 2 TIMES DAILY
Qty: 60 TABLET | Refills: 0 | Status: SHIPPED | OUTPATIENT
Start: 2022-10-26 | End: 2022-11-25

## 2022-10-26 NOTE — PATIENT INSTRUCTIONS
Stop Ibuprofen and continue prednisone 5mg twice a day   We will await your TB test to proceed with Marco A Sr authorization   If Mark Johnson is not approved, we will try Etanercept.  Please see attched info about this medication   RTC in 4 weeks

## 2022-10-29 LAB
QUANTI TB1 MINUS NIL: 0.02 IU/ML (ref 0–0.34)
QUANTI TB2 MINUS NIL: 0.02 IU/ML (ref 0–0.34)
QUANTIFERON (R) TB GOLD (INCUBATED): NEGATIVE IU/ML
QUANTIFERON MITOGEN MINUS NIL: 3.65 IU/ML
QUANTIFERON NIL: 0.05 IU/ML

## 2022-10-31 ENCOUNTER — HOSPITAL ENCOUNTER (OUTPATIENT)
Dept: WOMENS IMAGING | Age: 47
Discharge: HOME | End: 2022-10-31

## 2022-10-31 ENCOUNTER — TELEPHONE (OUTPATIENT)
Dept: RHEUMATOLOGY | Age: 47
End: 2022-10-31

## 2022-10-31 DIAGNOSIS — Z79.52 CURRENT CHRONIC USE OF SYSTEMIC STEROIDS: ICD-10-CM

## 2022-10-31 DIAGNOSIS — E66.9 OBESITY (BMI 30.0-34.9): ICD-10-CM

## 2022-10-31 DIAGNOSIS — M19.90 INFLAMMATORY ARTHRITIS: ICD-10-CM

## 2022-10-31 PROCEDURE — 77080 DXA BONE DENSITY AXIAL: CPT

## 2022-10-31 NOTE — TELEPHONE ENCOUNTER
Pt called in her insurance isn't covering the Zelgance. Her insurance doesn't cover any special medications. She is wanting to know if there is anything else or what else she can do.  Please advise [phone]

## 2022-11-01 ENCOUNTER — APPOINTMENT (OUTPATIENT)
Dept: GENERAL RADIOLOGY | Age: 47
End: 2022-11-01
Payer: COMMERCIAL

## 2022-11-01 ENCOUNTER — HOSPITAL ENCOUNTER (EMERGENCY)
Age: 47
Discharge: HOME | End: 2022-11-01
Attending: EMERGENCY MEDICINE
Payer: COMMERCIAL

## 2022-11-01 VITALS
HEART RATE: 98 BPM | WEIGHT: 200 LBS | OXYGEN SATURATION: 95 % | HEIGHT: 64 IN | BODY MASS INDEX: 34.15 KG/M2 | DIASTOLIC BLOOD PRESSURE: 96 MMHG | TEMPERATURE: 98.2 F | SYSTOLIC BLOOD PRESSURE: 148 MMHG | RESPIRATION RATE: 18 BRPM

## 2022-11-01 DIAGNOSIS — M25.552 ACUTE HIP PAIN, LEFT: Primary | ICD-10-CM

## 2022-11-01 PROCEDURE — 6360000002 HC RX W HCPCS: Performed by: EMERGENCY MEDICINE

## 2022-11-01 PROCEDURE — 73502 X-RAY EXAM HIP UNI 2-3 VIEWS: CPT

## 2022-11-01 PROCEDURE — 96374 THER/PROPH/DIAG INJ IV PUSH: CPT

## 2022-11-01 PROCEDURE — 99284 EMERGENCY DEPT VISIT MOD MDM: CPT

## 2022-11-01 RX ORDER — IBUPROFEN 600 MG/1
600 TABLET ORAL EVERY 8 HOURS PRN
Qty: 20 TABLET | Refills: 0 | Status: SHIPPED | OUTPATIENT
Start: 2022-11-01

## 2022-11-01 RX ORDER — KETOROLAC TROMETHAMINE 30 MG/ML
15 INJECTION, SOLUTION INTRAMUSCULAR; INTRAVENOUS ONCE
Status: COMPLETED | OUTPATIENT
Start: 2022-11-01 | End: 2022-11-01

## 2022-11-01 RX ADMIN — KETOROLAC TROMETHAMINE 15 MG: 30 INJECTION, SOLUTION INTRAMUSCULAR; INTRAVENOUS at 13:16

## 2022-11-01 ASSESSMENT — PAIN SCALES - GENERAL: PAINLEVEL_OUTOF10: 4

## 2022-11-01 NOTE — ED NOTES
The patient was ambulated. The patient had pain in her left hip into the back of her left upper leg when standing. The patient was only able to take a few steps due to the pain.      Viviane Martins RN  11/01/22 [id number]

## 2022-11-01 NOTE — ED TRIAGE NOTES
To [facility] per squad for a work injury today,patient was trying to stop a patient from falling and when she caught him she felt a pop to her left upper leg

## 2022-11-01 NOTE — ED PROVIDER NOTES
Yes   Other Topics Concern    Not on file   Social History Narrative    Not on file     Social Determinants of Health     Financial Resource Strain: Not on file   Food Insecurity: Not on file   Transportation Needs: Not on file   Physical Activity: Unknown    Days of Exercise per Week: Patient refused    Minutes of Exercise per Session: Not on file   Stress: Not on file   Social Connections: Not on file   Intimate Partner Violence: Not At Risk    Fear of Current or Ex-Partner: No    Emotionally Abused: No    Physically Abused: No    Sexually Abused: No   Housing Stability: Not on file     No current facility-administered medications for this encounter. Current Outpatient Medications   Medication Sig Dispense Refill    ibuprofen (ADVIL;MOTRIN) 600 MG tablet Take 1 tablet by mouth every 8 hours as needed for Pain 20 tablet 0    predniSONE (DELTASONE) 5 MG tablet Take 1 tablet by mouth 2 times daily 60 tablet 0    metFORMIN (GLUCOPHAGE-XR) 500 MG extended release tablet Take 2 tablets by mouth daily (with breakfast) 180 tablet 1    diclofenac (VOLTAREN) 75 MG EC tablet Take 1 tablet by mouth 2 times daily (Patient not taking: No sig reported) 60 tablet 3    omeprazole (PRILOSEC) 40 MG delayed release capsule Take 1 capsule by mouth every morning (before breakfast) 90 capsule 0    Magnesium 400 MG TABS Take 1 tablet by mouth daily (Patient not taking: No sig reported) 30 tablet 1    sulfaSALAzine (AZULFIDINE) 500 MG tablet Take 1 tablet by mouth 4 times daily (Patient not taking: No sig reported) 120 tablet 0    blood glucose monitor strips 1 strip by Other route daily as needed for Other (monitoring blood sugar) Test once a day & as needed for symptoms of irregular blood glucose. Dispense sufficient amount for indicated testing frequency plus additional to accommodate PRN testing needs.  (Patient not taking: No sig reported) 100 strip 3    Lancets MISC 1 each by Does not apply route daily (Patient not taking: No sig reported) 100 each 5    glucose monitoring (FREESTYLE FREEDOM) kit 1 kit by Does not apply route daily (Patient not taking: No sig reported) 1 kit 0     Allergies   Allergen Reactions    Hydroxyquinolines Hives and Itching       Nursing Notes Reviewed    Physical Exam:  ED Triage Vitals [11/01/22 1300]   Enc Vitals Group      BP (!) 148/96      Heart Rate 98      Resp 18      Temp 98.2 °F (36.8 °C)      Temp Source Oral      SpO2 95 %      Weight 200 lb (90.7 kg)      Height 5' 4\" (1.626 m)      Head Circumference       Peak Flow       Pain Score       Pain Loc       Pain Edu? Excl. in [address]? GENERAL APPEARANCE: Awake and alert. Cooperative. No acute distress. Tearful, uncomfortable. HEAD: Normocephalic. Atraumatic. EYES: EOM's grossly intact. Sclera anicteric. ENT: Tolerates saliva. No trismus. NECK: Supple. Trachea midline. CARDIO: RRR. Radial pulse 2+. LUNGS: Respirations unlabored. CTAB. ABDOMEN: Soft. Non-distended. Non-tender. Back: No midline T or L-spine tenderness. EXTREMITIES: No acute deformities. No lower extremity edema or asymmetry. Patient has tenderness over the right ischial tuberosity. No palpable defect over the thigh. She has intact flexion and extension at both hip, knee, and ankle. Symmetric lower extremity pulses. SKIN: Warm and dry. NEUROLOGICAL: No gross facial drooping. Moves all 4 extremities spontaneously. Symmetric strength and intact sensation x4 extremities. PSYCHIATRIC: Normal mood. Labs:  No results found for this visit on 11/01/22. Radiographs (if obtained):  [] The following radiograph was interpreted by myself in the absence of a radiologist:  [x] Radiologist's Report reviewed at time of ED visit:  XR CHEST (2 VW)    Result Date: 10/23/2022  EXAMINATION: TWO XRAY VIEWS OF THE CHEST 10/22/2022 9:16 am COMPARISON: None.  HISTORY: ORDERING SYSTEM PROVIDED HISTORY: Inflammatory arthritis TECHNOLOGIST PROVIDED HISTORY: Reason for exam:->hx of inflammatory arthritis with Psoriasis being w/u for Psa vs RA FINDINGS: The heart size is within normal limits. The pulmonary vasculature is also within normal limits. No acute infiltrates are seen. The costophrenic angles are sharp bilaterally. No pneumothoraces are noted. 1. No active pulmonary disease. XR WRIST LEFT (MIN 3 VIEWS)    Result Date: 10/23/2022  EXAMINATION: THREE XRAY VIEWS OF THE LEFT HAND; THREE XRAY VIEWS OF THE RIGHT HAND; THREE XRAY VIEWS OF THE LEFT WRIST; THREE  XRAY VIEWS OF THE RIGHT WRIST 10/22/2022 9:16 am COMPARISON: None. HISTORY: ORDERING SYSTEM PROVIDED HISTORY: Inflammatory arthritis TECHNOLOGIST PROVIDED HISTORY: Reason for exam:->hx of inflammatory arthritis with Psoriasis being w/u for Psa vs RA FINDINGS: The bone mineralization is within normal limits. The joint spaces appear unremarkable. No acute fractures or dislocations are seen. There is no soft tissue swelling. No bony erosions are seen. 1. No acute abnormality involving the right or left hands or wrists. 2. No radiographic manifestations of any arthritides. XR WRIST RIGHT (MIN 3 VIEWS)    Result Date: 10/23/2022  EXAMINATION: THREE XRAY VIEWS OF THE LEFT HAND; THREE XRAY VIEWS OF THE RIGHT HAND; THREE XRAY VIEWS OF THE LEFT WRIST; THREE  XRAY VIEWS OF THE RIGHT WRIST 10/22/2022 9:16 am COMPARISON: None. HISTORY: ORDERING SYSTEM PROVIDED HISTORY: Inflammatory arthritis TECHNOLOGIST PROVIDED HISTORY: Reason for exam:->hx of inflammatory arthritis with Psoriasis being w/u for Psa vs RA FINDINGS: The bone mineralization is within normal limits. The joint spaces appear unremarkable. No acute fractures or dislocations are seen. There is no soft tissue swelling. No bony erosions are seen. 1. No acute abnormality involving the right or left hands or wrists. 2. No radiographic manifestations of any arthritides.      XR HAND LEFT (MIN 3 VIEWS)    Result Date: 10/23/2022  EXAMINATION: THREE XRAY VIEWS OF THE LEFT HAND; THREE XRAY VIEWS OF THE RIGHT HAND; THREE XRAY VIEWS OF THE LEFT WRIST; THREE  XRAY VIEWS OF THE RIGHT WRIST 10/22/2022 9:16 am COMPARISON: None. HISTORY: ORDERING SYSTEM PROVIDED HISTORY: Inflammatory arthritis TECHNOLOGIST PROVIDED HISTORY: Reason for exam:->hx of inflammatory arthritis with Psoriasis being w/u for Psa vs RA FINDINGS: The bone mineralization is within normal limits. The joint spaces appear unremarkable. No acute fractures or dislocations are seen. There is no soft tissue swelling. No bony erosions are seen. 1. No acute abnormality involving the right or left hands or wrists. 2. No radiographic manifestations of any arthritides. XR HAND RIGHT (MIN 3 VIEWS)    Result Date: 10/23/2022  EXAMINATION: THREE XRAY VIEWS OF THE LEFT HAND; THREE XRAY VIEWS OF THE RIGHT HAND; THREE XRAY VIEWS OF THE LEFT WRIST; THREE  XRAY VIEWS OF THE RIGHT WRIST 10/22/2022 9:16 am COMPARISON: None. HISTORY: ORDERING SYSTEM PROVIDED HISTORY: Inflammatory arthritis TECHNOLOGIST PROVIDED HISTORY: Reason for exam:->hx of inflammatory arthritis with Psoriasis being w/u for Psa vs RA FINDINGS: The bone mineralization is within normal limits. The joint spaces appear unremarkable. No acute fractures or dislocations are seen. There is no soft tissue swelling. No bony erosions are seen. 1. No acute abnormality involving the right or left hands or wrists. 2. No radiographic manifestations of any arthritides. XR FOOT LEFT (MIN 3 VIEWS)    Result Date: 10/23/2022  EXAMINATION: THREE XRAY VIEWS OF THE LEFT FOOT; THREE XRAY VIEWS OF THE RIGHT FOOT 10/22/2022 9:16 am COMPARISON: None. HISTORY: ORDERING SYSTEM PROVIDED HISTORY: Inflammatory arthritis TECHNOLOGIST PROVIDED HISTORY: Reason for exam:->hx of inflammatory arthritis with Psoriasis being w/u for Psa vs RA FINDINGS: The bone mineralization is within normal limits. The joint spaces appear unremarkable.   No acute fractures or dislocations are seen.  There is no soft tissue swelling. No bony erosions are seen. 1. No acute abnormality involving the right left foot. 2. No radiographic manifestations of any arthritides. XR FOOT RIGHT (MIN 3 VIEWS)    Result Date: 10/23/2022  EXAMINATION: THREE XRAY VIEWS OF THE LEFT FOOT; THREE XRAY VIEWS OF THE RIGHT FOOT 10/22/2022 9:16 am COMPARISON: None. HISTORY: ORDERING SYSTEM PROVIDED HISTORY: Inflammatory arthritis TECHNOLOGIST PROVIDED HISTORY: Reason for exam:->hx of inflammatory arthritis with Psoriasis being w/u for Psa vs RA FINDINGS: The bone mineralization is within normal limits. The joint spaces appear unremarkable. No acute fractures or dislocations are seen. There is no soft tissue swelling. No bony erosions are seen. 1. No acute abnormality involving the right left foot. 2. No radiographic manifestations of any arthritides. DEXA BONE DENSITY AXIAL SKELETON    Result Date: 11/2/2022  EXAMINATION: BONE DENSITOMETRY 10/31/2022 12:29 pm TECHNIQUE: A bone density dual x-ray absorptiometry (DXA) scan was performed of the lumbar spine and left hip on a [employer] system. COMPARISON: None. HISTORY: ORDERING SYSTEM PROVIDED HISTORY: Obesity (BMI 30.0-34. 9) TECHNOLOGIST PROVIDED HISTORY: Is the patient pregnant?->No Gender: F Age: 42 y/o FINDINGS: LUMBAR SPINE L1-L4: BMD: 1.456 g/cm2 Z-score: 1.5 LEFT TOTAL HIP: BMD: 1.140 g/cm2 Z-score: 0.8 LEFT FEMORAL NECK: BMD: 1.036 g/cm2 Z-score: 0.0     Bone mineral density is within the expected range for age. XR HIP 2-3 VW W PELVIS LEFT    Result Date: 11/1/2022  EXAMINATION: ONE XRAY VIEW OF THE PELVIS AND TWO XRAY VIEWS LEFT HIP 11/1/2022 1:41 pm COMPARISON: None. HISTORY: ORDERING SYSTEM PROVIDED HISTORY: trauma TECHNOLOGIST PROVIDED HISTORY: Reason for exam:->trauma Reason for Exam: trauma FINDINGS: Bones: No acute fracture. No aggressive osseous lesion. Joints: Joint spaces maintained. Normal alignment.  Soft tissues: No acute abnormality identified. No acute fracture or malalignment. ED Course and MDM:  Patient is given pain medication here. She is neurologically intact. Imaging obtained. No evidence of fracture or other acute bony abnormality. I think patient is appropriate for outpatient management. Offered crutches but declines. Encouraged to rest, ice, and use anti-inflammatories as needed for further discomfort. Patient is given instructions regarding symptomatic care at home as well as return precautions. To call PCP for follow up in 2-3 days. Patient verbalizes understanding of all instructions and is comfortable with the plan of care. Final Impression:  1.  Acute hip pain, left      DISPOSITION Decision To Discharge 11/01/2022 03:00:02 PM      Patient referred to:  Junie Mercado MD  [address]  [phone]    Schedule an appointment as soon as possible for a visit in 2 days      [facility] Emergency Department  [address] [place] [id number]  [phone]    If symptoms worsen  Discharge medications:  Discharge Medication List as of 11/1/2022  3:11 PM        (Please note that portions of this note may have been completed with a voice recognition program. Efforts were made to edit the dictations but occasionally words are mis-transcribed.)    Royce Soto,   11/05/22 1817

## 2022-11-24 NOTE — PROGRESS NOTES
RHEUMATOLOGY FOLLOW UP VISIT    2022      Patient Name: Alice Beckman  : 1975  Medical Record: [id number]      CHIEF COMPLAINT  PsA- Nail changes, hx of PSO, asymmetrical arthritis   RF+     Pertinent Problems  Gastritis  Diabetes Mellitus   Scalp psoriasis    HISTORY OF PRESENT ILLNESS    Mark Sharpe is a 41 y.o. female established since 10/17/2022  Problem began in 2020 which began with left shoulder pain that worsened to neck, shoulder pain then bilateral hand swelling and puffiness. She responded significantly to prednisone which helped her joints (though not her psoriasis very much) and she establised care with outside rheumatologist who treated with MTX 17.5mg that caused hair loss and symptom did not improve. Took Humira for almost 6 weeks that caused abdominal hives. Started Nelson Leer which helped her joints significantly, she took for 3 months that caused elevated liver enzymes then it was held. Afterwards, aches and pains were controlled with Ibuprofen. She has been getting prednisone refills from PCP since 2021. Hx of HCQ use in 2022 took for 5 days and broke out in hives. 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    Joint stiffness+ and swelling +  Pain level today: 2/10, the cold weather makes her pain worse   She feels prednisone does not cause PsO flares      Other rheumatologic symptoms :  Psoriasis +     Current rheum meds: prednisone 5-10mg daily started 1/2022 and Ibuprofen 800mg     Past rheum meds: HCQ, MTX, Humira     No flowsheet data found.     REVIEW OF SYSTEMS     Constitutional:  Denies fever or chills, decreased appetite, or weight loss   Eyes:  Denies change in visual acuity or eye dryness or irritation  HENT:  Denies dry mouth or oral ulcers  Respiratory:  Denies cough or shortness of breath   Cardiovascular:  Denies chest pain or edema   GI:  Denies abdominal pain, nausea, vomiting, bloody stools or diarrhea   :  Denies dysuria or hematuria  Musculoskeletal:  See HPI  Integument:  PsO+  Neurologic:  Denies headache, focal weakness or sensory changes   Endocrine:  Denies polyuria or polydipsia   Lymphatic:  Denies swollen glands   Psychiatric:  Denies depression or anxiety       PROBLEM LIST    Patient Active Problem List   Diagnosis    Abnormal uterine bleeding    NSAID induced gastritis    Chronic inflammatory arthritis    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)    Muscle cramp    Subjective tinnitus, right    Non-dose-related adverse reaction to medication       MEDICATIONS    Current Outpatient Medications   Medication Sig Dispense Refill    ibuprofen (ADVIL;MOTRIN) 600 MG tablet Take 1 tablet by mouth every 8 hours as needed for Pain 20 tablet 0    predniSONE (DELTASONE) 5 MG tablet Take 1 tablet by mouth 2 times daily 60 tablet 0    metFORMIN (GLUCOPHAGE-XR) 500 MG extended release tablet Take 2 tablets by mouth daily (with breakfast) 180 tablet 1    diclofenac (VOLTAREN) 75 MG EC tablet Take 1 tablet by mouth 2 times daily (Patient not taking: No sig reported) 60 tablet 3    omeprazole (PRILOSEC) 40 MG delayed release capsule Take 1 capsule by mouth every morning (before breakfast) 90 capsule 0    Magnesium 400 MG TABS Take 1 tablet by mouth daily (Patient not taking: No sig reported) 30 tablet 1    sulfaSALAzine (AZULFIDINE) 500 MG tablet Take 1 tablet by mouth 4 times daily (Patient not taking: No sig reported) 120 tablet 0    blood glucose monitor strips 1 strip by Other route daily as needed for Other (monitoring blood sugar) Test once a day & as needed for symptoms of irregular blood glucose. Dispense sufficient amount for indicated testing frequency plus additional to accommodate PRN testing needs. (Patient not taking: No sig reported) 100 strip 3    Lancets MISC 1 each by Does not apply route daily (Patient not taking: No sig reported) 100 each 5    glucose monitoring (FREESTYLE FREEDOM) kit 1 kit by Does not apply route daily (Patient not taking: No sig reported) 1 kit 0     No current facility-administered medications for this visit.        ALLERGIES    Allergies   Allergen Reactions    Hydroxyquinolines Hives and Itching       PAST MEDICAL HISTORY      Past Medical History:   Diagnosis Date    Abnormal uterine bleeding     pt is scheduled for thermachoice ablation 7/24/2013\"heavy bleeding for almost a year- getting worse\"\"have to wear a pad all the time- heavy to light but some kind of drainage every day\"    Anemia     Rheumatoid arthritis ([facility] [address]          SOCIAL HISTORY     Social History     Socioeconomic History    Marital status:    Tobacco Use    Smoking status: Never    Smokeless tobacco: Never   Vaping Use    Vaping Use: Never used   Substance and Sexual Activity    Alcohol use: No    Drug use: No    Sexual activity: Yes     Social Determinants of Health     Physical Activity: Unknown    Days of Exercise per Week: Patient refused   Intimate Partner Violence: Not At Risk    Fear of Current or Ex-Partner: No    Emotionally Abused: No    Physically Abused: No    Sexually Abused: No         FAMILY HISTORY     Family History   Problem Relation Age of Onset    Lupus Brother          PHYSICAL EXAM     Wt Readings from Last 3 Encounters:   11/01/22 200 lb (90.7 kg)   10/26/22 192 lb (87.1 kg)   10/17/22 186 lb 6.4 oz (84.6 kg)     Temp Readings from Last 3 Encounters:   11/01/22 98.2 °F (36.8 °C) (Oral)   09/19/22 (!) 96.6 °F (35.9 °C) (Infrared)   08/22/22 97.7 °F (36.5 °C) (Infrared)     BP Readings from Last 3 Encounters:   11/01/22 (!) 148/96   10/26/22 125/80   10/17/22 (!) 160/90     Pulse Readings from Last 3 Encounters:   11/01/22 98   10/26/22 92   10/17/22 79       General appearance:  Alert and oriented, NAD, well developed   HEENT: EOMI, no scleral injection, moist mucous membranes, no oral ulcers, normal hearing, no cartilage inflammation  Neck: Trachea midline, no masses  Lymph: no LAD  Lungs: CTAB, no rales  Heart: regular rate and rhythm, S1, S2 normal, no murmur, no lower extremity edema  Abdomen: Soft, ND, NT. + BS. Extremities: atraumatic, no cyanosis or edema. Neurologic: CN 2-12 grossly intact. Skin: Psoriasis on scalp and belly button+ , hypopigmented rash, nail pitting+  scattered on bilateral forearm, warm and dry, no telangiectasias, no digital pitting, no sclerodactyly, no rheumatoid nodules, no livedo  MSK: 5/5 strength of the proximal and distal muscles of the upper and lower extremities. WRIST: Bilateral Synovitis +   HANDS: PIP synovitis+,  good ROM,   Elbow: No synovitis, good ROM,   Shoulder:good ROM,   Knee: no effusion, good ROM,   Ankle:synovitis+ good ROM,   FEET: right pos forefoot squeeze test    Spine:  Normal range of motion; no tender points, no obvious deformities. Neuro:  Alert & oriented x 3, normal motor function, normal sensory function, no focal deficits noted . Muscle strength: 4/4 in bilateral upper and lower extremities.     Psychiatric: Mood and affect are appropriate, recent and remote memory normal,    LABS AND IMAGING  I have reviewed her result performed during our previous encounter   Xray of the wrists, hands and feet are unremarkable, there are no soft tissue changes, periosteal changes, joint space narrowing or erosions   Labs showed mild anemia  Elevated CRP and ESR   Hepatitis Panel neg   Quant neg   Rf pos  CCP neg   DEXA scan normal     Assessment     Patient is a 42 yo f with inflammatory arthritis more suggestive of PsA though RF is Positive. This can be seen in few cases of PsA. She has a hx of poor tolerance to meds. MTX caused hair loss, HCQ caused hypopigmented/ hyperpigmented skin rash and nail changes. Humira caused Hives. Steroids is currently helping joints but not PsO rash. Features of PsA include asymetrical large joint involving in left shoulders and right ankle, hx of PsO and nail changes. Amorita Hoguet was well tolerated and significantly helped her joints and PsO though could not be continued due to insurance issues. Will start Cimzia, patient advised to enroll in [employer] program.    1. PsA  2. PsO  - Taper off prednisone 5mg daily every other day and stop  - Start celebrex 200mg bid with food afterwards   - Cimzia ordered today    3. Obesity (BMI 30.0-34. 9)  CV RISK: 11/24/22  Discussed with the patient about increased CV risk associated with PsA  I have reviewed the comorbidities including  obesity/ DM /HTN/ HLD /smoking. There is no height or weight on file to calculate BMI.;   Lab Results   Component Value Date    LDLCALC 97 10/22/2022   - Advised to follow up with PCP/Cardiology for primary cardiovascular disease prevention and for yearly lipid panel check. - Encouraged life style modification including exercise, low fat diet and weight loss. - hgbA1c with next lab draw      4. High risk medication use  CERTOLIZUMAB  I reviewed: increased risk of infections, malignancy including lymphoma, skin cancer, demyelinating disorders, TB, fungal infections, hepatitis B reactivation, immunosuppression, lupus-like syndrome, cytopenias, liver disorders, CHF onset, exacerbation.   - [facility] re: side effects injection site reaction and management, instructed to rotate injection sites at least 1 inch apart, no live vaccines.       Patient Instructions  We will start Cimzia    Take prednisone 5mg every other day until finish then start celebrex 200mg twice daily with food   Daily calcium 1000-1200mg and vit d supplement 2000 units helps to preserve bone health  RTC in 6 weeks       -  The patient indicates understanding of these issues and agrees with the plan. I spent 20 minutes on the date of service, preparing to see the patient (eg, review of tests), obtaining and/or reviewing separately obtained history, counseling and educating the family/caregiver, ordering medications, tests, or procedures and documenting clinical information in the electronic or other health record, care coordination (not separately reported)      Patricia Guy MD  Portions of this note was copied forward from the note written by me on 10/26/2022. I have reviewed and updated the history, physical exam, data, assessment and plan of the note so that it reflects the current evaluation and management of the patient. Addendum 12/16/2022  Cimzia was not approved by insurance. Insurance will not approve any biologics   Notified by patient that she is inflamed all over and in so much pain. Will send in short term prednisone today and plan to see her on 12/21 to discuss leflunomide.      - Prednisone 15mg daily x 30 days   - stop celebrex

## 2022-11-28 ENCOUNTER — OFFICE VISIT (OUTPATIENT)
Dept: RHEUMATOLOGY | Age: 47
End: 2022-11-28
Payer: COMMERCIAL

## 2022-11-28 VITALS
DIASTOLIC BLOOD PRESSURE: 80 MMHG | BODY MASS INDEX: 33.13 KG/M2 | HEART RATE: 80 BPM | SYSTOLIC BLOOD PRESSURE: 130 MMHG | OXYGEN SATURATION: 99 % | WEIGHT: 193 LBS

## 2022-11-28 DIAGNOSIS — L40.9 PSORIASIS: ICD-10-CM

## 2022-11-28 DIAGNOSIS — Z79.52 CURRENT CHRONIC USE OF SYSTEMIC STEROIDS: ICD-10-CM

## 2022-11-28 DIAGNOSIS — L40.50 PSORIATIC ARTHRITIS (HCC): Primary | ICD-10-CM

## 2022-11-28 DIAGNOSIS — Z79.899 HIGH RISK MEDICATION USE: ICD-10-CM

## 2022-11-28 PROCEDURE — 99214 OFFICE O/P EST MOD 30 MIN: CPT | Performed by: STUDENT IN AN ORGANIZED HEALTH CARE EDUCATION/TRAINING PROGRAM

## 2022-11-28 RX ORDER — CELECOXIB 200 MG/1
200 CAPSULE ORAL 2 TIMES DAILY
Qty: 60 CAPSULE | Refills: 2 | Status: SHIPPED | OUTPATIENT
Start: 2022-11-28

## 2022-11-28 NOTE — PATIENT INSTRUCTIONS
We will start Cimzia    Please see attched info about this medication   Take prednisone 5mg every other day until finish then start celebrex 200mg twice daily with food   Daily calcium 1000-1200mg and vit d supplement 2000 units helps to preserve bone health  RTC in 6 weeks

## 2022-11-30 ENCOUNTER — TELEPHONE (OUTPATIENT)
Dept: RHEUMATOLOGY | Age: 47
End: 2022-11-30

## 2022-11-30 NOTE — TELEPHONE ENCOUNTER
I called Moris Serrato with [employer], She stated the insurance has to be ran in order for them to be able to do something. We let her know the forms they need are faxed over and she said she will stay in touch. I called the patient and let her know this as well.

## 2022-11-30 NOTE — TELEPHONE ENCOUNTER
Patient called in today stating the specialty pharmacy for her [facility] called her and the pharmacist told her he has to do a PA, he also told her that if her insurance won't cover the medication she can't get it. She called stating she knows her insurance won't cover it and is asking what to do.  Please advise

## 2022-12-06 ENCOUNTER — TELEPHONE (OUTPATIENT)
Dept: RHEUMATOLOGY | Age: 47
End: 2022-12-06

## 2022-12-06 NOTE — TELEPHONE ENCOUNTER
Spoke with Kate Jones regarding the prescription for [employer]. Kate Jones states that the pts insurance will cover this medication in the powder form.  Please advise if this is something you would like to move forward with doing

## 2022-12-08 NOTE — TELEPHONE ENCOUNTER
Spoke with the  Bharathi Henry to move forward with the powder form of Geeta Oakley and was told that she was wrong originally and that the powder form isn't covered. Bharathi Henry states the pt has no prescription coverage.

## 2022-12-09 NOTE — TELEPHONE ENCOUNTER
Pt was made aware that Sulfasalazine has been sent in to the pharmacy. Pt was advised possible interactions of the medication per her previous AVS. Pt was also informed she could look back on the AVS as well. Pt states she was on Sulfasalazine previously and was in so much pain and it didn't help her. Please advise on this. Pt was also advised to have her lab work done in 4 weeks of starting the medication. Pt verbalized understanding. Pt was advised this is important to have done in the fourth week of starting the medication and her follow up appointment is 10/12/22.

## 2022-12-15 ENCOUNTER — TELEPHONE (OUTPATIENT)
Dept: RHEUMATOLOGY | Age: 47
End: 2022-12-15

## 2022-12-15 NOTE — TELEPHONE ENCOUNTER
Patient called in asking for more prednisone as she is so inflamed she is struggling to put weight on her feet. She has one left and she is stating it is just not working. Please advise how to move forward.

## 2022-12-16 NOTE — TELEPHONE ENCOUNTER
I called patient in reference to her medication and she understood.  She is rescheduled for 8:45am on 12/21

## 2022-12-20 NOTE — PROGRESS NOTES
RHEUMATOLOGY FOLLOW UP VISIT    2022      Patient Name: Anabelle Watkins  : 1975  Medical Record: [id number]      CHIEF COMPLAINT  PsA- Nail changes, hx of PSO, asymmetrical arthritis   RF+     Pertinent Problems  Gastritis  Diabetes Mellitus   Scalp psoriasis    HISTORY OF PRESENT ILLNESS    Mark Padron is a 41 y.o. female established since 10/17/2022  Problem began in 2020 which began with left shoulder pain that worsened to neck, shoulder pain then bilateral hand swelling and puffiness. She responded significantly to prednisone which helped her joints (though not her psoriasis very much) and she establised care with outside rheumatologist who treated with MTX 17.5mg that caused hair loss and symptom did not improve. Took Humira for almost 6 weeks that caused abdominal hives. Started Corey Damián which helped her joints significantly, she took for 3 months that caused elevated liver enzymes then it was held. SSZ did not help her joints   Afterwards, aches and pains were controlled with Ibuprofen. She has been getting prednisone refills from PCP since 2021. Hx of HCQ use in 2022 took for 5 days and broke out in hives. 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  Efforts to enroll her in [employer] discount program failed  Xray of the wrists, hands and feet are unremarkable, there are no soft tissue changes, periosteal changes, joint space narrowing or erosions   Labs showed mild anemia  Elevated CRP and ESR   Hepatitis Panel neg   Quant in process    Disease progression:   Flared on , prednisone was increased to 15mg by telephone encounter, since she started taking her pain has improved  Joint stiffness+ and swelling +  Pain level today: 4/10, the cold weather makes her pain worse        Current rheum meds: prednisone 15mg daily started 2022    Past rheum meds: Ibuprofen, HCQ, MTX, Humira, SSZ    No flowsheet data found.     REVIEW OF SYSTEMS     Constitutional:  Denies fever or chills, decreased appetite, or weight loss   Eyes:  Denies change in visual acuity or eye dryness or irritation  HENT:  Denies dry mouth or oral ulcers  Respiratory:  Denies cough or shortness of breath   Cardiovascular:  Denies chest pain or edema   GI:  Denies abdominal pain, nausea, vomiting, bloody stools or diarrhea   :  Denies dysuria or hematuria  Musculoskeletal:  See HPI  Integument:  PsO+  Neurologic:  Denies headache, focal weakness or sensory changes   Endocrine:  Denies polyuria or polydipsia   Lymphatic:  Denies swollen glands   Psychiatric:  Denies depression or anxiety       PROBLEM LIST    Patient Active Problem List   Diagnosis    Abnormal uterine bleeding    NSAID induced gastritis    Chronic inflammatory arthritis    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) ([facility])    Muscle cramp    Subjective tinnitus, right    Non-dose-related adverse reaction to medication       MEDICATIONS    Current Outpatient Medications   Medication Sig Dispense Refill    leflunomide (ARAVA) 10 MG tablet Take 1 tablet by mouth daily 30 tablet 1    predniSONE (DELTASONE) 5 MG tablet Take 3 tablets by mouth daily 90 tablet 1    ibuprofen (ADVIL;MOTRIN) 600 MG tablet Take 1 tablet by mouth every 8 hours as needed for Pain 20 tablet 0    metFORMIN (GLUCOPHAGE-XR) 500 MG extended release tablet Take 2 tablets by mouth daily (with breakfast) 180 tablet 1    diclofenac (VOLTAREN) 75 MG EC tablet Take 1 tablet by mouth 2 times daily 60 tablet 3    omeprazole (PRILOSEC) 40 MG delayed release capsule Take 1 capsule by mouth every morning (before breakfast) 90 capsule 0    Magnesium 400 MG TABS Take 1 tablet by mouth daily 30 tablet 1    blood glucose monitor strips 1 strip by Other route daily as needed for Other (monitoring blood sugar) Test once a day & as needed for symptoms of irregular blood glucose. Dispense sufficient amount for indicated testing frequency plus additional to accommodate PRN testing needs. 100 strip 3    Lancets MISC 1 each by Does not apply route daily 100 each 5    glucose monitoring (FREESTYLE FREEDOM) kit 1 kit by Does not apply route daily 1 kit 0     No current facility-administered medications for this visit.        ALLERGIES    Allergies   Allergen Reactions    Hydroxyquinolines Hives and Itching       PAST MEDICAL HISTORY      Past Medical History:   Diagnosis Date    Abnormal uterine bleeding     pt is scheduled for thermachoice ablation 7/24/2013\"heavy bleeding for almost a year- getting worse\"\"have to wear a pad all the time- heavy to light but some kind of drainage every day\"    Anemia     Rheumatoid arthritis ([facility] [address]          SOCIAL HISTORY     Social History     Socioeconomic History    Marital status:    Tobacco Use    Smoking status: Never    Smokeless tobacco: Never   Vaping Use    Vaping Use: Never used   Substance and Sexual Activity    Alcohol use: No    Drug use: No    Sexual activity: Yes     Social Determinants of Health     Physical Activity: Unknown    Days of Exercise per Week: Patient refused   Intimate Partner Violence: Not At Risk    Fear of Current or Ex-Partner: No    Emotionally Abused: No    Physically Abused: No    Sexually Abused: No         FAMILY HISTORY     Family History   Problem Relation Age of Onset    Lupus Brother          PHYSICAL EXAM     Wt Readings from Last 3 Encounters:   12/21/22 191 lb (86.6 kg)   11/28/22 193 lb (87.5 kg)   11/01/22 200 lb (90.7 kg)     Temp Readings from Last 3 Encounters:   11/01/22 98.2 °F (36.8 °C) (Oral)   09/19/22 (!) 96.6 °F (35.9 °C) (Infrared)   08/22/22 97.7 °F (36.5 °C) (Infrared)     BP Readings from Last 3 Encounters:   12/21/22 120/65   11/28/22 130/80   11/01/22 (!) 148/96     Pulse Readings from Last 3 Encounters:   12/21/22 56   11/28/22 80   11/01/22 98       General appearance: Alert and oriented, NAD, well developed   HEENT: EOMI, no scleral injection, moist mucous membranes, no oral ulcers, normal hearing, no cartilage inflammation  Neck: Trachea midline, no masses  Lymph: no LAD  Lungs: CTAB, no rales  Heart: regular rate and rhythm, S1, S2 normal, no murmur, no lower extremity edema  Abdomen: Soft, ND, NT. + BS. Extremities: atraumatic, no cyanosis or edema. Neurologic: CN 2-12 grossly intact. Skin: Psoriasis on scalp and belly button+ , hypopigmented rash, nail pitting+  scattered on bilateral forearm, warm and dry, no telangiectasias, no digital pitting, no sclerodactyly, no rheumatoid nodules, no livedo  MSK: 5/5 strength of the proximal and distal muscles of the upper and lower extremities. WRIST: Bilateral Synovitis +   HANDS: PIP synovitis+,  good ROM,   Elbow: No synovitis, good ROM,   Shoulder:good ROM,   Knee: no effusion, good ROM,   Ankle:synovitis+ good ROM,   FEET: right pos forefoot squeeze test    Spine:  Normal range of motion; no tender points, no obvious deformities. Neuro:  Alert & oriented x 3, normal motor function, normal sensory function, no focal deficits noted . Muscle strength: 4/4 in bilateral upper and lower extremities. Psychiatric: Mood and affect are appropriate, recent and remote memory normal,    LABS AND IMAGING  I have reviewed her result performed during our previous encounter   Xray of the wrists, hands and feet are unremarkable, there are no soft tissue changes, periosteal changes, joint space narrowing or erosions   Labs showed mild anemia  Elevated CRP and ESR   Hepatitis Panel neg   Quant neg   Rf pos  CCP neg   DEXA scan normal     Assessment     Patient is a 42 yo f with inflammatory arthritis more suggestive of PsA though RF is Positive. This can be seen in few cases of PsA. She has a hx of poor tolerance to meds. MTX caused hair loss, HCQ caused hypopigmented/ hyperpigmented skin rash and nail changes. Humira caused Hives. Features of PsA include asymetrical large joint involving in left shoulders and right ankle, hx of PsO and nail changes. [facility] and [employer] were not approved by insurance, they are currently not approving any biologics. Will start Leflunomide, will attempt apremilast if this fails     1. PsA  2. PsO  -  Leflunomide 10mg daily   - prednisone 15mg daily to reduce to 10mg daily after 2 weeks     3. Obesity (BMI 30.0-34. 9)  CV RISK: 12/21/22  Discussed with the patient about increased CV risk associated with PsA  I have reviewed the comorbidities including  obesity/ DM /HTN/ HLD /smoking. Body mass index is 32.79 kg/m².;   Lab Results   Component Value Date    LDLCALC 97 10/22/2022   - Advised to follow up with PCP/Cardiology for primary cardiovascular disease prevention and for yearly lipid panel check. - Encouraged life style modification including exercise, low fat diet and weight loss. - HgbA1c ordered      4. High risk medication use  LEFLUNOMIDE  We reviewed the rational for the use of leflunomide in RA as well as the more common side effects including hair loss, cramping, loss of appetite, diarrhea and it requires monthly monitoring of labs for bone marrow suppression and liver inflammation/damage x 3 months, then every 8-12 weeks thereafter. Women should not get pregnant on this medication - it causes birth defects. Discussed the most common side effect of leflunomide being diarrhea, which occurs in approximately 20% of patients. This symptom frequently improves with time or medications given to prevent diarrhea. If diarrhea persists, the dose of leflunomide may need to be reduced. Other common side effects include nausea, stomach pain, indigestion, rash, or hair loss. In fewer than 10% of patients, leflunomide can cause abnormal liver function tests or decreased blood cell or platelet counts. Rarely, this drug may cause lung problems such as cough, shortness of breath or lung injury.   Discussed that

## 2022-12-21 ENCOUNTER — OFFICE VISIT (OUTPATIENT)
Dept: RHEUMATOLOGY | Age: 47
End: 2022-12-21

## 2022-12-21 VITALS
DIASTOLIC BLOOD PRESSURE: 65 MMHG | BODY MASS INDEX: 32.79 KG/M2 | HEART RATE: 56 BPM | OXYGEN SATURATION: 99 % | SYSTOLIC BLOOD PRESSURE: 120 MMHG | WEIGHT: 191 LBS

## 2022-12-21 DIAGNOSIS — L40.50 PSORIATIC ARTHRITIS (HCC): Primary | ICD-10-CM

## 2022-12-21 DIAGNOSIS — L40.9 PSORIASIS: ICD-10-CM

## 2022-12-21 DIAGNOSIS — E66.9 OBESITY (BMI 30.0-34.9): ICD-10-CM

## 2022-12-21 DIAGNOSIS — Z79.899 HIGH RISK MEDICATION USE: ICD-10-CM

## 2022-12-21 RX ORDER — LEFLUNOMIDE 10 MG/1
10 TABLET ORAL DAILY
Qty: 30 TABLET | Refills: 1 | Status: SHIPPED | OUTPATIENT
Start: 2022-12-21

## 2022-12-21 NOTE — PATIENT INSTRUCTIONS
We will start leflunomide 10mg daily  Take prednisone 10- 15mg for joint pain   Daily calcium 1000-1200mg and vit d supplement 800- 2000 units helps to preserve bone health  RTC in 8 weeks

## 2023-02-04 ENCOUNTER — HOSPITAL ENCOUNTER (OUTPATIENT)
Age: 48
Discharge: HOME | End: 2023-02-04
Payer: COMMERCIAL

## 2023-02-04 DIAGNOSIS — Z79.899 HIGH RISK MEDICATION USE: ICD-10-CM

## 2023-02-04 DIAGNOSIS — L40.50 PSORIATIC ARTHRITIS (HCC): ICD-10-CM

## 2023-02-04 LAB
ALBUMIN SERPL-MCNC: 3.8 GM/DL (ref 3.4–5)
ALP BLD-CCNC: 57 IU/L (ref 40–129)
ALT SERPL-CCNC: 28 U/L (ref 10–40)
ANION GAP SERPL CALCULATED.3IONS-SCNC: 14 MMOL/L (ref 4–16)
AST SERPL-CCNC: 24 IU/L (ref 15–37)
BILIRUB SERPL-MCNC: 0.3 MG/DL (ref 0–1)
BUN SERPL-MCNC: 8 MG/DL (ref 6–23)
CALCIUM SERPL-MCNC: 8.8 MG/DL (ref 8.3–10.6)
CHLORIDE BLD-SCNC: 106 MMOL/L (ref 99–110)
CO2: 19 MMOL/L (ref 21–32)
CREAT SERPL-MCNC: 0.5 MG/DL (ref 0.6–1.1)
ESTIMATED AVERAGE GLUCOSE: 240 MG/DL
GFR SERPL CREATININE-BSD FRML MDRD: >60 ML/MIN/1.73M2
GLUCOSE SERPL-MCNC: 184 MG/DL (ref 70–99)
HBA1C MFR BLD: 10 % (ref 4.2–6.3)
HCT VFR BLD CALC: 32.2 % (ref 37–47)
HEMOGLOBIN: 10.2 GM/DL (ref 12.5–16)
MCH RBC QN AUTO: 27.4 PG (ref 27–31)
MCHC RBC AUTO-ENTMCNC: 31.7 % (ref 32–36)
MCV RBC AUTO: 86.6 FL (ref 78–100)
PDW BLD-RTO: 13.2 % (ref 11.7–14.9)
PLATELET # BLD: 290 K/CU MM (ref 140–440)
PMV BLD AUTO: 9 FL (ref 7.5–11.1)
POTASSIUM SERPL-SCNC: 3.4 MMOL/L (ref 3.5–5.1)
RBC # BLD: 3.72 M/CU MM (ref 4.2–5.4)
SODIUM BLD-SCNC: 139 MMOL/L (ref 135–145)
TOTAL PROTEIN: 6.2 GM/DL (ref 6.4–8.2)
WBC # BLD: 4.6 K/CU MM (ref 4–10.5)

## 2023-02-04 PROCEDURE — 83036 HEMOGLOBIN GLYCOSYLATED A1C: CPT

## 2023-02-04 PROCEDURE — 80053 COMPREHEN METABOLIC PANEL: CPT

## 2023-02-04 PROCEDURE — 85027 COMPLETE CBC AUTOMATED: CPT

## 2023-02-06 DIAGNOSIS — E09.9 STEROID-INDUCED DIABETES MELLITUS, SUBSEQUENT ENCOUNTER (HCC): Primary | ICD-10-CM

## 2023-02-06 DIAGNOSIS — T38.0X5D STEROID-INDUCED DIABETES MELLITUS, SUBSEQUENT ENCOUNTER (HCC): Primary | ICD-10-CM

## 2023-02-06 DIAGNOSIS — E87.6 HYPOKALEMIA: ICD-10-CM

## 2023-02-06 RX ORDER — DULAGLUTIDE 0.75 MG/.5ML
0.75 INJECTION, SOLUTION SUBCUTANEOUS WEEKLY
Qty: 4 ADJUSTABLE DOSE PRE-FILLED PEN SYRINGE | Refills: 1 | Status: SHIPPED | OUTPATIENT
Start: 2023-02-06

## 2023-02-06 RX ORDER — POTASSIUM CHLORIDE 750 MG/1
10 TABLET, EXTENDED RELEASE ORAL DAILY
Qty: 90 TABLET | Refills: 0 | Status: SHIPPED | OUTPATIENT
Start: 2023-02-06

## 2023-02-18 NOTE — PROGRESS NOTES
RHEUMATOLOGY FOLLOW UP VISIT    2023      Patient Name: Tiera Beebe  : 1975  Medical Record: [id number]      CHIEF COMPLAINT  PsA- Nail changes, hx of PSO, asymmetrical arthritis   RF+     Pertinent Problems  Gastritis  Diabetes Mellitus   Scalp psoriasis    HISTORY OF PRESENT ILLNESS    Mark Wong is a 41 y.o. female established since 10/17/2022  Problem began in 2020 which began with left shoulder pain that worsened to neck, shoulder pain then bilateral hand swelling and puffiness. She responded significantly to prednisone which helped her joints (though not her psoriasis very much) and she establised care with outside rheumatologist who treated with MTX 17.5mg that caused hair loss and symptom did not improve. Took Humira for almost 6 weeks that caused abdominal hives. Started Parul Miyamoto which helped her joints significantly, she took for 3 months that caused elevated liver enzymes then it was held. SSZ did not help her joints   Afterwards, aches and pains were controlled with Ibuprofen. She has been getting prednisone refills from PCP since 2021. Hx of HCQ use in 2022 took for 5 days and broke out in hives. 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 Efforts to enroll her in [facility] and [place] discount program failed  Xray of the wrists, hands and feet are unremarkable, there are no soft tissue changes, periosteal changes, joint space narrowing or erosions   Labs showed mild anemia  Elevated CRP and ESR   Hepatitis Panel neg   Quant in process    Today she is reporting that pain is unchanged at 4/10  She is paying for LEF out of pocket and is thinking about switching jobs.   Takes prednisone 5mg PRN for moderate to severe pain  Joint stiffness+ and swelling +  Pain level today: 4/10, the cold weather makes her pain worse Current rheum meds: Leflunomide +  prednisone 5mg PRN (started 12/19/2022)    Past rheum meds: Ibuprofen, HCQ, MTX, Humira, SSZ    No flowsheet data found.     REVIEW OF SYSTEMS     Constitutional:  Denies fever or chills, decreased appetite, or weight loss   Eyes:  Denies change in visual acuity or eye dryness or irritation  HENT:  Denies dry mouth or oral ulcers  Respiratory:  Denies cough or shortness of breath   Cardiovascular:  Denies chest pain or edema   GI:  Denies abdominal pain, nausea, vomiting, bloody stools or diarrhea   :  Denies dysuria or hematuria  Musculoskeletal:  See HPI  Integument:  PsO+  Neurologic:  Denies headache, focal weakness or sensory changes   Endocrine:  Denies polyuria or polydipsia   Lymphatic:  Denies swollen glands   Psychiatric:  Denies depression or anxiety       PROBLEM LIST    Patient Active Problem List   Diagnosis    Abnormal uterine bleeding    NSAID induced gastritis    Chronic inflammatory arthritis    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)    Muscle cramp    Subjective tinnitus, right    Non-dose-related adverse reaction to medication       MEDICATIONS    Current Outpatient Medications   Medication Sig Dispense Refill    potassium chloride (KLOR-CON M) 10 MEQ extended release tablet Take 1 tablet by mouth daily 90 tablet 0    Dulaglutide (TRULICITY) 4.74 ND/1.6ZW SOPN Inject 0.75 mg into the skin once a week 4 Adjustable Dose Pre-filled Pen Syringe 1    leflunomide (ARAVA) 10 MG tablet Take 1 tablet by mouth daily 30 tablet 1    predniSONE (DELTASONE) 5 MG tablet Take 3 tablets by mouth daily 90 tablet 1    ibuprofen (ADVIL;MOTRIN) 600 MG tablet Take 1 tablet by mouth every 8 hours as needed for Pain 20 tablet 0    metFORMIN (GLUCOPHAGE-XR) 500 MG extended release tablet Take 2 tablets by mouth daily (with breakfast) 180 tablet 1    diclofenac (VOLTAREN) 75 MG EC tablet Take 1 tablet by mouth 2 times daily 60 tablet 3 omeprazole (PRILOSEC) 40 MG delayed release capsule Take 1 capsule by mouth every morning (before breakfast) 90 capsule 0    Magnesium 400 MG TABS Take 1 tablet by mouth daily 30 tablet 1    blood glucose monitor strips 1 strip by Other route daily as needed for Other (monitoring blood sugar) Test once a day & as needed for symptoms of irregular blood glucose. Dispense sufficient amount for indicated testing frequency plus additional to accommodate PRN testing needs. 100 strip 3    Lancets MISC 1 each by Does not apply route daily 100 each 5    glucose monitoring (FREESTYLE FREEDOM) kit 1 kit by Does not apply route daily 1 kit 0     No current facility-administered medications for this visit.        ALLERGIES    Allergies   Allergen Reactions    Hydroxyquinolines Hives and Itching       PAST MEDICAL HISTORY      Past Medical History:   Diagnosis Date    Abnormal uterine bleeding     pt is scheduled for thermachoice ablation 7/24/2013\"heavy bleeding for almost a year- getting worse\"\"have to wear a pad all the time- heavy to light but some kind of drainage every day\"    Anemia     Rheumatoid arthritis ([facility] [address]          SOCIAL HISTORY     Social History     Socioeconomic History    Marital status:    Tobacco Use    Smoking status: Never    Smokeless tobacco: Never   Vaping Use    Vaping Use: Never used   Substance and Sexual Activity    Alcohol use: No    Drug use: No    Sexual activity: Yes     Social Determinants of Health     Physical Activity: Unknown    Days of Exercise per Week: Patient refused   Intimate Partner Violence: Not At Risk    Fear of Current or Ex-Partner: No    Emotionally Abused: No    Physically Abused: No    Sexually Abused: No         FAMILY HISTORY     Family History   Problem Relation Age of Onset    Lupus Brother          PHYSICAL EXAM     Wt Readings from Last 3 Encounters:   12/21/22 191 lb (86.6 kg)   11/28/22 193 lb (87.5 kg)   11/01/22 200 lb (90.7 kg)     Temp Readings from Last 3 Encounters:   11/01/22 98.2 °F (36.8 °C) (Oral)   09/19/22 (!) 96.6 °F (35.9 °C) (Infrared)   08/22/22 97.7 °F (36.5 °C) (Infrared)     BP Readings from Last 3 Encounters:   12/21/22 120/65   11/28/22 130/80   11/01/22 (!) 148/96     Pulse Readings from Last 3 Encounters:   12/21/22 56   11/28/22 80   11/01/22 98       General appearance:  Alert and oriented, NAD, well developed   HEENT: EOMI, no scleral injection, moist mucous membranes, no oral ulcers, normal hearing, no cartilage inflammation  Neck: Trachea midline, no masses  Lymph: no LAD  Lungs: CTAB, no rales  Heart: regular rate and rhythm, S1, S2 normal, no murmur, no lower extremity edema  Abdomen: Soft, ND, NT. + BS. Extremities: atraumatic, no cyanosis or edema. Neurologic: CN 2-12 grossly intact. Skin: Psoriasis +, hypopigmented rash, nail pitting+  scattered on bilateral forearm, warm and dry, no telangiectasias, no digital pitting, no sclerodactyly, no rheumatoid nodules, no livedo  MSK: 5/5 strength of the proximal and distal muscles of the upper and lower extremities. WRIST: Bilateral Synovitis +   HANDS: PIP synovitis+,  good ROM,   Elbow: No synovitis, good ROM,   Shoulder:good ROM,   Knee: no effusion, good ROM,   Ankle:synovitis+ good ROM,   FEET: right pos forefoot squeeze test    Spine:  Normal range of motion; no tender points, no obvious deformities. Neuro:  Alert & oriented x 3, normal motor function, normal sensory function, no focal deficits noted . Muscle strength: 4/4 in bilateral upper and lower extremities.     Psychiatric: Mood and affect are appropriate, recent and remote memory normal,    LABS AND IMAGING  I have reviewed her result performed during our previous encounter   Xray of the wrists, hands and feet are unremarkable, there are no soft tissue changes, periosteal changes, joint space narrowing or erosions   Labs showed mild anemia  Elevated CRP and ESR   Hepatitis Panel neg   Quant neg   Rf pos  CCP neg   DEXA scan normal     Assessment     Patient is a 42 yo f with inflammatory arthritis more suggestive of PsA though RF is Positive. This can be seen in few cases of PsA. She has a hx of poor tolerance to meds. MTX caused hair loss, HCQ caused hypopigmented/ hyperpigmented skin rash and nail changes. Humira caused Hives. Features of PsA include asymetrical large joint involving in left shoulders and right ankle, hx of PsO and nail changes. [facility] and [employer] were not approved by insurance, they are currently not approving any biologics, we tried looking up apremilast and it was listed as a specialty medication in insurance drug formulary. Will increase leflunomide today. Psoriatic arthritis ([facility] [address]  -     Cancel: Sedimentation Rate; Future  -     Cancel: C-Reactive Protein; Future  -     leflunomide (ARAVA) 10 MG tablet; Take 2 tablets by mouth daily  -     predniSONE (DELTASONE) 5 MG tablet; Take 1 tablet by mouth daily        Obesity (BMI 30.0-34. 9)  CV RISK: 2/18/23  Discussed with the patient about increased CV risk associated with PsA  I have reviewed the comorbidities including  obesity/ DM /HTN/ HLD /smoking. There is no height or weight on file to calculate BMI.;   Lab Results   Component Value Date    LDLCALC 97 10/22/2022   - Advised to follow up with PCP/Cardiology for primary cardiovascular disease prevention and for yearly lipid panel check. - Encouraged life style modification including exercise, low fat diet and weight loss. High risk medication use  LEFLUNOMIDE  We reviewed the rational for the use of leflunomide in RA as well as the more common side effects including hair loss, cramping, loss of appetite, diarrhea and it requires monthly monitoring of labs for bone marrow suppression and liver inflammation/damage x 3 months, then every 8-12 weeks thereafter. Women should not get pregnant on this medication - it causes birth defects.     Discussed the most common side effect of leflunomide being diarrhea, which occurs in approximately 20% of patients. This symptom frequently improves with time or medications given to prevent diarrhea. If diarrhea persists, the dose of leflunomide may need to be reduced. Other common side effects include nausea, stomach pain, indigestion, rash, or hair loss. In fewer than 10% of patients, leflunomide can cause abnormal liver function tests or decreased blood cell or platelet counts. Rarely, this drug may cause lung problems such as cough, shortness of breath or lung injury. Discussed that regular blood testing will be required to ensure the safety of this medication. Lastly, did discuss that leflunomide can cause severe fetal birth defects and should not be used in females who are capable to become pregnant or desire to do so. The medication requires a long period (at least 6 months) to fully leave the body and to alleviate birth defect risks. Current chronic use of systemic steroids  Daily calcium 1000-1200mg and vit d supplement 2000 units helps to preserve bone health    Patient Instructions  We will increase leflunomide to 20mg daily  Continue prednisone 5mg for joint pain   Daily calcium 1000-1200mg and vit d supplement 800- 2000 units helps to preserve bone health  RTC in 2  months    -  The patient indicates understanding of these issues and agrees with the plan. I spent 16 minutes on the date of service, preparing to see the patient (eg, review of tests), obtaining and/or reviewing separately obtained history, counseling and educating the family/caregiver, ordering medications, tests, or procedures and documenting clinical information in the electronic or other health record, care coordination (not separately reported)      Monty Emanuel MD  Portions of this note was copied forward from the note written by me on 12/21/2022.   I have reviewed and updated the history, physical exam, data, assessment and plan of the note so that it reflects the current evaluation and management of the patient.

## 2023-02-20 ENCOUNTER — OFFICE VISIT (OUTPATIENT)
Dept: RHEUMATOLOGY | Age: 48
End: 2023-02-20

## 2023-02-20 VITALS
HEART RATE: 107 BPM | HEIGHT: 64 IN | DIASTOLIC BLOOD PRESSURE: 86 MMHG | SYSTOLIC BLOOD PRESSURE: 122 MMHG | OXYGEN SATURATION: 98 % | BODY MASS INDEX: 31.58 KG/M2 | WEIGHT: 185 LBS | RESPIRATION RATE: 16 BRPM

## 2023-02-20 DIAGNOSIS — Z79.899 HIGH RISK MEDICATION USE: ICD-10-CM

## 2023-02-20 DIAGNOSIS — L40.50 PSORIATIC ARTHRITIS (HCC): Primary | ICD-10-CM

## 2023-02-20 DIAGNOSIS — Z79.52 CURRENT CHRONIC USE OF SYSTEMIC STEROIDS: ICD-10-CM

## 2023-02-20 DIAGNOSIS — E66.9 OBESITY (BMI 30.0-34.9): ICD-10-CM

## 2023-02-20 RX ORDER — LEFLUNOMIDE 10 MG/1
20 TABLET ORAL DAILY
Qty: 60 TABLET | Refills: 1 | Status: SHIPPED | OUTPATIENT
Start: 2023-02-20

## 2023-02-20 RX ORDER — PREDNISONE 1 MG/1
5 TABLET ORAL DAILY
Qty: 60 TABLET | Refills: 0 | Status: SHIPPED | OUTPATIENT
Start: 2023-02-20

## 2023-02-20 NOTE — PATIENT INSTRUCTIONS
We will increase leflunomide to 20mg daily  Continue prednisone 5mg for joint pain   Daily calcium 1000-1200mg and vit d supplement 800- 2000 units helps to preserve bone health  RTC in 2  months

## 2023-03-13 ENCOUNTER — OFFICE VISIT (OUTPATIENT)
Dept: FAMILY MEDICINE CLINIC | Age: 48
End: 2023-03-13

## 2023-03-13 VITALS
HEIGHT: 64 IN | OXYGEN SATURATION: 99 % | HEART RATE: 95 BPM | TEMPERATURE: 97.9 F | BODY MASS INDEX: 30.66 KG/M2 | WEIGHT: 179.6 LBS | SYSTOLIC BLOOD PRESSURE: 126 MMHG | DIASTOLIC BLOOD PRESSURE: 80 MMHG

## 2023-03-13 DIAGNOSIS — K29.60 NSAID INDUCED GASTRITIS: ICD-10-CM

## 2023-03-13 DIAGNOSIS — M19.90 CHRONIC INFLAMMATORY ARTHRITIS: Primary | ICD-10-CM

## 2023-03-13 DIAGNOSIS — T38.0X5D STEROID-INDUCED DIABETES MELLITUS, SUBSEQUENT ENCOUNTER (HCC): ICD-10-CM

## 2023-03-13 DIAGNOSIS — T39.395A NSAID INDUCED GASTRITIS: ICD-10-CM

## 2023-03-13 DIAGNOSIS — E09.9 STEROID-INDUCED DIABETES MELLITUS, SUBSEQUENT ENCOUNTER (HCC): ICD-10-CM

## 2023-03-13 RX ORDER — ETODOLAC 400 MG/1
400 TABLET, FILM COATED ORAL 2 TIMES DAILY
Qty: 60 TABLET | Refills: 0 | Status: SHIPPED | OUTPATIENT
Start: 2023-03-13 | End: 2024-03-12

## 2023-03-13 RX ORDER — MELOXICAM 15 MG/1
15 TABLET ORAL DAILY PRN
Qty: 30 TABLET | Refills: 0 | Status: SHIPPED | OUTPATIENT
Start: 2023-03-13

## 2023-03-13 RX ORDER — METFORMIN HYDROCHLORIDE 500 MG/1
1000 TABLET, EXTENDED RELEASE ORAL 2 TIMES DAILY
Qty: 360 TABLET | Refills: 1 | Status: SHIPPED | OUTPATIENT
Start: 2023-03-13

## 2023-03-13 SDOH — ECONOMIC STABILITY: INCOME INSECURITY: HOW HARD IS IT FOR YOU TO PAY FOR THE VERY BASICS LIKE FOOD, HOUSING, MEDICAL CARE, AND HEATING?: PATIENT DECLINED

## 2023-03-13 SDOH — ECONOMIC STABILITY: FOOD INSECURITY: WITHIN THE PAST 12 MONTHS, THE FOOD YOU BOUGHT JUST DIDN'T LAST AND YOU DIDN'T HAVE MONEY TO GET MORE.: PATIENT DECLINED

## 2023-03-13 SDOH — ECONOMIC STABILITY: FOOD INSECURITY: WITHIN THE PAST 12 MONTHS, YOU WORRIED THAT YOUR FOOD WOULD RUN OUT BEFORE YOU GOT MONEY TO BUY MORE.: PATIENT DECLINED

## 2023-03-13 SDOH — ECONOMIC STABILITY: TRANSPORTATION INSECURITY
IN THE PAST 12 MONTHS, HAS LACK OF TRANSPORTATION KEPT YOU FROM MEETINGS, WORK, OR FROM GETTING THINGS NEEDED FOR DAILY LIVING?: NO

## 2023-03-13 SDOH — ECONOMIC STABILITY: HOUSING INSECURITY
IN THE LAST 12 MONTHS, WAS THERE A TIME WHEN YOU DID NOT HAVE A STEADY PLACE TO SLEEP OR SLEPT IN A SHELTER (INCLUDING NOW)?: NO

## 2023-03-13 NOTE — PROGRESS NOTES
3/13/23    Mark Morrison  1975    Leonardo Brito is a 41 y.o. female who presents today for evaluation of:  Chief Complaint   Patient presents with    6 Month Follow-Up     Steroid related DM2 : She stopped steroids the day prior to the A1c of 10.0 and had taken steroids for a yr due to RA. She just started metformin since Trulicity was denied. No fam hx of DM2. No problems with the metformin. The first day had diarrhea. Not much diarrhea since. BS are usually 150 range and once was up to 200. She is trying to avoid sugar and carbs. She has lost 20# since Nov 2022. She uses 800 or 1000 mg otc ibuprofen 2-3 times a day. Cold weather makes it worse. RA : working with a rheumatologist. She has a lot of pain. She takes ibuprofen for the pain. No kidney problems. No stomach ulcers. She has tried sulfasalazine, methotrexate, and hydroxychloroquine with side effects. Apremalast helped. She does use some omeprazole to help with stomach heartburn. Allergies   Allergen Reactions    Hydroxyquinolines Hives and Itching        OBJECTIVE    /80 (Site: Left Upper Arm, Position: Sitting, Cuff Size: Large Adult)   Pulse 95   Temp 97.9 °F (36.6 °C) (Infrared)   Ht 5' 4\" (1.626 m)   Wt 179 lb 9.6 oz (81.5 kg)   SpO2 99%   BMI 30.83 kg/m²     Physical Exam   Constitutional:       General: Not in acute distress. Appearance: Normal appearance. Not ill-appearing. Eyes:      General: No scleral icterus. Cardiovascular:      Rate and Rhythm: Normal rate and regular rhythm. Heart sounds: No murmur heard. No friction rub. No gallop. Pulmonary:      Effort: Pulmonary effort is normal. No respiratory distress. Breath sounds: No wheezing, rhonchi or rales. Abdominal:      Palpations: Abdomen is soft. There is no mass. Tenderness: There is no abdominal tenderness. Musculoskeletal:     Moves all extremities normally. Skin:     General: Skin is warm.       Coloration: Skin is not jaundiced. Neurological:      Mental Status: Patient is alert. Psychiatric:         Behavior: Behavior normal.         Thought Content: Thought content normal.         Judgment: Judgment normal.          ASSESSMENT/PLAN:    1. Chronic inflammatory arthritis  Assessment & Plan:   She is working with a rheumatologist.  It is frustrating that her insurance will not cover the recommended medications. She would like to try either etodolac or meloxicam to see if they would help to relieve the pain and feels that her stomach is healthy enough to tolerate them with the omeprazole she is taking. I will put out prescriptions for both so that she may test them but I gave her instructions not to take them simultaneously. Pain is not currently controlled with the lower cost medicines she has been using. Orders:  -     [address]  -     etodolac (LODINE) 400 MG tablet; Take 1 tablet by mouth 2 times daily Do not take on same day as meloxicam., Disp-60 tablet, R-0Normal  -     meloxicam (MOBIC) 15 MG tablet; Take 1 tablet by mouth daily as needed for Pain Do not take on same day as etodolac., Disp-30 tablet, R-0Normal  2. Steroid-induced diabetes mellitus, subsequent encounter [facility])  Assessment & Plan:   She had to be taking prednisone for some amount of time due to chronic inflammatory arthritis. She was diagnosed with diabetes and it is uncertain whether the diabetes is steroid-induced versus diabetes type 2. When we retest an A1c in 2 to 3 months we will readdress this question since she stopped steroid use just before her most recent A1c was done. In the meantime I explained that she may increase the metformin to 2 tablets in the morning and 1 in the evening or even 2 tablets in the morning and 2 in the evening. Orders:  -     Hemoglobin A1C; Future  -     metFORMIN (GLUCOPHAGE-XR) 500 MG extended release tablet; Take 2 tablets by mouth 2 times daily, Disp-360 tablet, R-1Normal  3.  NSAID induced gastritis  Assessment & Plan:   She is using omeprazole to help reduce gastritis. Counseling provided for:  Healthy eating - Avoid sugar and other refined carbohydrates. and Eat more foods with fiber like vegetables and whole grain products.   >> Exercise - 1> try to do 150 minutes a week of exercise that is as hard as walking briskly (30 minutes 5 days a week or 22 minutes every day); and 2> do some strength training 2 or 3 times a week. Return in about 2 months (around 5/13/2023) for to find out it DM2 is \"real\" or steroid.    Momo Rubio MD

## 2023-03-13 NOTE — ASSESSMENT & PLAN NOTE
She is working with a rheumatologist.  It is frustrating that her insurance will not cover the recommended medications. She would like to try either etodolac or meloxicam to see if they would help to relieve the pain and feels that her stomach is healthy enough to tolerate them with the omeprazole she is taking. I will put out prescriptions for both so that she may test them but I gave her instructions not to take them simultaneously. Pain is not currently controlled with the lower cost medicines she has been using.

## 2023-03-13 NOTE — ASSESSMENT & PLAN NOTE
She had to be taking prednisone for some amount of time due to chronic inflammatory arthritis. She was diagnosed with diabetes and it is uncertain whether the diabetes is steroid-induced versus diabetes type 2. When we retest an A1c in 2 to 3 months we will readdress this question since she stopped steroid use just before her most recent A1c was done. In the meantime I explained that she may increase the metformin to 2 tablets in the morning and 1 in the evening or even 2 tablets in the morning and 2 in the evening.

## 2023-05-20 LAB — HBA1C MFR BLD: 6.5 % (ref 4–6)

## 2023-05-22 ENCOUNTER — OFFICE VISIT (OUTPATIENT)
Dept: FAMILY MEDICINE CLINIC | Age: 48
End: 2023-05-22
Payer: COMMERCIAL

## 2023-05-22 VITALS
TEMPERATURE: 97.3 F | BODY MASS INDEX: 26.4 KG/M2 | HEIGHT: 64 IN | WEIGHT: 154.6 LBS | OXYGEN SATURATION: 98 % | DIASTOLIC BLOOD PRESSURE: 70 MMHG | HEART RATE: 113 BPM | SYSTOLIC BLOOD PRESSURE: 110 MMHG

## 2023-05-22 DIAGNOSIS — M54.50 CHRONIC BILATERAL LOW BACK PAIN WITHOUT SCIATICA: ICD-10-CM

## 2023-05-22 DIAGNOSIS — G89.29 CHRONIC BILATERAL LOW BACK PAIN WITHOUT SCIATICA: ICD-10-CM

## 2023-05-22 DIAGNOSIS — E09.9 STEROID-INDUCED DIABETES MELLITUS, SUBSEQUENT ENCOUNTER (HCC): ICD-10-CM

## 2023-05-22 DIAGNOSIS — D64.9 ANEMIA, UNSPECIFIED TYPE: ICD-10-CM

## 2023-05-22 DIAGNOSIS — T38.0X5D STEROID-INDUCED DIABETES MELLITUS, SUBSEQUENT ENCOUNTER (HCC): ICD-10-CM

## 2023-05-22 DIAGNOSIS — R11.2 NAUSEA AND VOMITING, UNSPECIFIED VOMITING TYPE: Primary | ICD-10-CM

## 2023-05-22 LAB
ALBUMIN/GLOBULIN RATIO: 1.8 RATIO (ref 0.8–2.6)
ALBUMIN: 4.6 G/DL (ref 3.5–5.2)
ALP BLD-CCNC: 76 U/L (ref 23–144)
ALT SERPL-CCNC: 36 U/L (ref 0–60)
AST SERPL-CCNC: 26 U/L (ref 0–55)
BILIRUB SERPL-MCNC: 0.4 MG/DL (ref 0–1.2)
BUN BLDV-MCNC: 16 MG/DL (ref 3–29)
BUN/CREAT BLD: 20 (ref 7–25)
CALCIUM SERPL-MCNC: 9.6 MG/DL (ref 8.5–10.5)
CHLORIDE BLD-SCNC: 99 MEQ/L (ref 96–110)
CO2: 18 MEQ/L (ref 19–32)
CREAT SERPL-MCNC: 0.8 MG/DL (ref 0.5–1.2)
GLOBULIN: 2.6 G/DL (ref 1.9–3.6)
GLOMERULAR FILTRATION RATE: 93 MLS/MIN/1.73M2
GLUCOSE BLD-MCNC: 84 MG/DL (ref 70–99)
HCT VFR BLD CALC: 33.9 % (ref 34–49)
HEMOGLOBIN: 11.5 G/DL (ref 11.2–15.7)
IRON SATURATION: 17 % (ref 12–57)
IRON: 49 MCG/DL (ref 35–175)
LIPASE: 26 U/L (ref 0–60)
MCH RBC QN AUTO: 26.8 PG (ref 26–34)
MCHC RBC AUTO-ENTMCNC: 33.9 G/DL (ref 30.7–35.5)
MCV RBC AUTO: 79 FL (ref 80–100)
PDW BLD-RTO: 14.2 %
PLATELET # BLD: 342 K/UL (ref 140–400)
PMV BLD AUTO: 10.2 FL (ref 7.2–11.7)
POTASSIUM SERPL-SCNC: 3.2 MEQ/L (ref 3.4–5.3)
RBC # BLD: 4.29 M/UL (ref 3.95–5.26)
SODIUM BLD-SCNC: 138 MEQ/L (ref 135–148)
STATUS: ABNORMAL
TOTAL IRON BINDING CAPACITY: 281 MCG/DL (ref 200–450)
TOTAL PROTEIN: 7.2 G/DL (ref 6–8.3)
WBC: 6.6 K/UL (ref 3.5–10.9)

## 2023-05-22 PROCEDURE — 99214 OFFICE O/P EST MOD 30 MIN: CPT | Performed by: FAMILY MEDICINE

## 2023-05-22 RX ORDER — LANCETS 30 GAUGE
1 EACH MISCELLANEOUS DAILY
Qty: 100 EACH | Refills: 5 | Status: SHIPPED | OUTPATIENT
Start: 2023-05-22

## 2023-05-22 RX ORDER — METFORMIN HYDROCHLORIDE 500 MG/1
1000 TABLET, EXTENDED RELEASE ORAL
Qty: 180 TABLET | Refills: 2 | Status: SHIPPED | OUTPATIENT
Start: 2023-05-22

## 2023-05-22 RX ORDER — GLUCOSAMINE HCL/CHONDROITIN SU 500-400 MG
1 CAPSULE ORAL DAILY PRN
Qty: 100 STRIP | Refills: 3 | Status: SHIPPED | OUTPATIENT
Start: 2023-05-22

## 2023-05-22 ASSESSMENT — ENCOUNTER SYMPTOMS
BLOOD IN STOOL: 0
SHORTNESS OF BREATH: 0
ANAL BLEEDING: 0
COUGH: 0
ABDOMINAL PAIN: 0
DIARRHEA: 0

## 2023-05-22 ASSESSMENT — PATIENT HEALTH QUESTIONNAIRE - PHQ9
SUM OF ALL RESPONSES TO PHQ9 QUESTIONS 1 & 2: 0
SUM OF ALL RESPONSES TO PHQ QUESTIONS 1-9: 0
2. FEELING DOWN, DEPRESSED OR HOPELESS: 0
SUM OF ALL RESPONSES TO PHQ QUESTIONS 1-9: 0
1. LITTLE INTEREST OR PLEASURE IN DOING THINGS: 0
SUM OF ALL RESPONSES TO PHQ QUESTIONS 1-9: 0
SUM OF ALL RESPONSES TO PHQ QUESTIONS 1-9: 0

## 2023-05-22 NOTE — PROGRESS NOTES
5/22/23    Mark Morrison  1975    Madhuri Carcamo is a 41 y.o. female who presents today for evaluation of:  Chief Complaint   Patient presents with    Follow-up     Check for Diabetes      DM - steroid : only taking metformin currently . Last steroids about 2 months ago. A1c was 6.5 a few days ago    Vomiting and weight loss : lost a lot of wt past 45 days. Vomits anything she eats. This started 1.5 month ago. Not taking trulicity. No diarrhea. Feels tired a lot. Back hurts. Stomach does not hurt. Back pain is worse if moves a lot. Seh is restless in nights. Lots of hiccups. No hx food allergies. Even with drinking water it comes up. Review of Systems   Constitutional:  Negative for fatigue. Respiratory:  Negative for cough and shortness of breath. Gastrointestinal:  Negative for abdominal pain, anal bleeding, blood in stool and diarrhea. Genitourinary:  Negative for dysuria, hematuria, vaginal bleeding, vaginal discharge and vaginal pain. Allergies   Allergen Reactions    Hydroxyquinolines Hives and Itching        OBJECTIVE    /70 (Site: Left Upper Arm, Position: Sitting, Cuff Size: Large Adult)   Pulse (!) 113   Temp 97.3 °F (36.3 °C) (Infrared)   Ht 5' 4\" (1.626 m)   Wt 154 lb 9.6 oz (70.1 kg)   SpO2 98%   BMI 26.54 kg/m²     Physical Exam   Constitutional:       General: Not in acute distress. Appearance: Normal appearance. Not ill-appearing. Eyes:      General: No scleral icterus. Cardiovascular:      Rate and Rhythm: Normal rate and regular rhythm. Heart sounds: No murmur heard. No friction rub. No gallop. Pulmonary:      Effort: Pulmonary effort is normal. No respiratory distress. Breath sounds: No wheezing, rhonchi or rales. Abdominal:      Palpations: Abdomen is soft. There is no mass. Tenderness: There is no abdominal tenderness. Musculoskeletal:     Moves all extremities normally. No spine tenderness.    Skin:     General:

## 2023-05-22 NOTE — ASSESSMENT & PLAN NOTE
We were hoping that if we checked an A1c after 2 months of not taking steroids that it would be normal so that we could say that she clearly had steroid-induced diabetes and not DM2. Her A1c was 6.5 which is right at the threshold and she reports that she did use some steroids about 2 months ago which would have been within the 3-4 month window for this A1c so I will leave the diagnosis of record at steroid-induced diabetes. However it is well controlled with the metformin. Even if there is no steroid effect for her diabetes is highly likely that she has prediabetes and that metformin would continue to be indicated and helpful for her long-term health.

## 2023-05-22 NOTE — ASSESSMENT & PLAN NOTE
She reports about a month and a half of worsened nausea and vomiting. It is enough that she has been losing weight significantly. She had some weight loss previously but it got significantly worse about a month and a half ago. I will check labs such as a CBC, CMP and lipase. She reports some back pain so I will check a lipase because of concern about the pancreas. I will also refer to a gastroenterology doctor. No

## 2023-06-19 ENCOUNTER — APPOINTMENT (OUTPATIENT)
Dept: CT IMAGING | Age: 48
DRG: 391 | End: 2023-06-19
Payer: COMMERCIAL

## 2023-06-19 ENCOUNTER — HOSPITAL ENCOUNTER (INPATIENT)
Age: 48
LOS: 2 days | Discharge: HOME | DRG: 391 | End: 2023-06-21
Attending: EMERGENCY MEDICINE | Admitting: STUDENT IN AN ORGANIZED HEALTH CARE EDUCATION/TRAINING PROGRAM
Payer: COMMERCIAL

## 2023-06-19 ENCOUNTER — TELEPHONE (OUTPATIENT)
Dept: GASTROENTEROLOGY | Age: 48
End: 2023-06-19

## 2023-06-19 ENCOUNTER — HOSPITAL ENCOUNTER (OUTPATIENT)
Age: 48
Discharge: HOME | DRG: 391 | End: 2023-06-19
Payer: COMMERCIAL

## 2023-06-19 ENCOUNTER — OFFICE VISIT (OUTPATIENT)
Dept: GASTROENTEROLOGY | Age: 48
End: 2023-06-19
Payer: COMMERCIAL

## 2023-06-19 VITALS
WEIGHT: 140 LBS | HEIGHT: 64 IN | HEART RATE: 91 BPM | SYSTOLIC BLOOD PRESSURE: 112 MMHG | DIASTOLIC BLOOD PRESSURE: 68 MMHG | OXYGEN SATURATION: 98 % | BODY MASS INDEX: 23.9 KG/M2

## 2023-06-19 DIAGNOSIS — D64.9 ANEMIA, UNSPECIFIED TYPE: ICD-10-CM

## 2023-06-19 DIAGNOSIS — D64.9 ANEMIA, UNSPECIFIED TYPE: Primary | ICD-10-CM

## 2023-06-19 DIAGNOSIS — R11.2 NAUSEA AND VOMITING, UNSPECIFIED VOMITING TYPE: ICD-10-CM

## 2023-06-19 DIAGNOSIS — E87.6 HYPOKALEMIA: ICD-10-CM

## 2023-06-19 DIAGNOSIS — R63.4 WEIGHT LOSS: ICD-10-CM

## 2023-06-19 DIAGNOSIS — R63.0 POOR APPETITE: ICD-10-CM

## 2023-06-19 DIAGNOSIS — R11.2 NAUSEA AND VOMITING, UNSPECIFIED VOMITING TYPE: Primary | ICD-10-CM

## 2023-06-19 LAB
ALBUMIN SERPL-MCNC: 4.1 GM/DL (ref 3.4–5)
ALBUMIN SERPL-MCNC: 4.2 GM/DL (ref 3.4–5)
ALP BLD-CCNC: 62 IU/L (ref 40–129)
ALP BLD-CCNC: 62 IU/L (ref 40–129)
ALT SERPL-CCNC: 33 U/L (ref 10–40)
ALT SERPL-CCNC: 33 U/L (ref 10–40)
ANION GAP SERPL CALCULATED.3IONS-SCNC: 15 MMOL/L (ref 4–16)
ANION GAP SERPL CALCULATED.3IONS-SCNC: 15 MMOL/L (ref 4–16)
AST SERPL-CCNC: 36 IU/L (ref 15–37)
AST SERPL-CCNC: 37 IU/L (ref 15–37)
BASOPHILS ABSOLUTE: 0 K/CU MM
BASOPHILS ABSOLUTE: 0 K/CU MM
BASOPHILS RELATIVE PERCENT: 0.7 % (ref 0–1)
BASOPHILS RELATIVE PERCENT: 0.9 % (ref 0–1)
BILIRUB SERPL-MCNC: 0.7 MG/DL (ref 0–1)
BILIRUB SERPL-MCNC: 0.8 MG/DL (ref 0–1)
BUN SERPL-MCNC: 10 MG/DL (ref 6–23)
BUN SERPL-MCNC: 10 MG/DL (ref 6–23)
CALCIUM SERPL-MCNC: 8.7 MG/DL (ref 8.3–10.6)
CALCIUM SERPL-MCNC: 9 MG/DL (ref 8.3–10.6)
CHLORIDE BLD-SCNC: 95 MMOL/L (ref 99–110)
CHLORIDE BLD-SCNC: 96 MMOL/L (ref 99–110)
CO2: 25 MMOL/L (ref 21–32)
CO2: 26 MMOL/L (ref 21–32)
CREAT SERPL-MCNC: 0.8 MG/DL (ref 0.6–1.1)
CREAT SERPL-MCNC: 0.8 MG/DL (ref 0.6–1.1)
DIFFERENTIAL TYPE: ABNORMAL
DIFFERENTIAL TYPE: ABNORMAL
EOSINOPHILS ABSOLUTE: 0.1 K/CU MM
EOSINOPHILS ABSOLUTE: 0.1 K/CU MM
EOSINOPHILS RELATIVE PERCENT: 1.7 % (ref 0–3)
EOSINOPHILS RELATIVE PERCENT: 2 % (ref 0–3)
FERRITIN: 409 NG/ML (ref 15–150)
FOLATE SERPL-MCNC: 6.9 NG/ML (ref 3.1–17.5)
GFR SERPL CREATININE-BSD FRML MDRD: >60 ML/MIN/1.73M2
GFR SERPL CREATININE-BSD FRML MDRD: >60 ML/MIN/1.73M2
GLUCOSE SERPL-MCNC: 110 MG/DL (ref 70–99)
GLUCOSE SERPL-MCNC: 117 MG/DL (ref 70–99)
HCG QUALITATIVE: NEGATIVE
HCT VFR BLD CALC: 35 % (ref 37–47)
HCT VFR BLD CALC: 35.7 % (ref 37–47)
HEMOGLOBIN: 11.5 GM/DL (ref 12.5–16)
HEMOGLOBIN: 11.6 GM/DL (ref 12.5–16)
IMMATURE NEUTROPHIL %: 0 % (ref 0–0.43)
IMMATURE NEUTROPHIL %: 0.2 % (ref 0–0.43)
LYMPHOCYTES ABSOLUTE: 1.3 K/CU MM
LYMPHOCYTES ABSOLUTE: 1.4 K/CU MM
LYMPHOCYTES RELATIVE PERCENT: 28.3 % (ref 24–44)
LYMPHOCYTES RELATIVE PERCENT: 30 % (ref 24–44)
MAGNESIUM: 2.1 MG/DL (ref 1.8–2.4)
MCH RBC QN AUTO: 26.5 PG (ref 27–31)
MCH RBC QN AUTO: 26.9 PG (ref 27–31)
MCHC RBC AUTO-ENTMCNC: 32.5 % (ref 32–36)
MCHC RBC AUTO-ENTMCNC: 32.9 % (ref 32–36)
MCV RBC AUTO: 80.6 FL (ref 78–100)
MCV RBC AUTO: 82.8 FL (ref 78–100)
MONOCYTES ABSOLUTE: 0.5 K/CU MM
MONOCYTES ABSOLUTE: 0.5 K/CU MM
MONOCYTES RELATIVE PERCENT: 10.2 % (ref 0–4)
MONOCYTES RELATIVE PERCENT: 10.6 % (ref 0–4)
NUCLEATED RBC %: 0 %
NUCLEATED RBC %: 0 %
PDW BLD-RTO: 14.6 % (ref 11.7–14.9)
PDW BLD-RTO: 14.9 % (ref 11.7–14.9)
PLATELET # BLD: 269 K/CU MM (ref 140–440)
PLATELET # BLD: 285 K/CU MM (ref 140–440)
PMV BLD AUTO: 10.3 FL (ref 7.5–11.1)
PMV BLD AUTO: 10.7 FL (ref 7.5–11.1)
POTASSIUM SERPL-SCNC: 2.6 MMOL/L (ref 3.5–5.1)
POTASSIUM SERPL-SCNC: 2.8 MMOL/L (ref 3.5–5.1)
RBC # BLD: 4.31 M/CU MM (ref 4.2–5.4)
RBC # BLD: 4.34 M/CU MM (ref 4.2–5.4)
RETICULOCYTE COUNT PCT: 1.6 % (ref 0.2–2.2)
SEGMENTED NEUTROPHILS ABSOLUTE COUNT: 2.6 K/CU MM
SEGMENTED NEUTROPHILS ABSOLUTE COUNT: 2.7 K/CU MM
SEGMENTED NEUTROPHILS RELATIVE PERCENT: 56.5 % (ref 36–66)
SEGMENTED NEUTROPHILS RELATIVE PERCENT: 58.9 % (ref 36–66)
SODIUM BLD-SCNC: 135 MMOL/L (ref 135–145)
SODIUM BLD-SCNC: 137 MMOL/L (ref 135–145)
TOTAL IMMATURE NEUTOROPHIL: 0 K/CU MM
TOTAL IMMATURE NEUTOROPHIL: 0.01 K/CU MM
TOTAL NUCLEATED RBC: 0 K/CU MM
TOTAL NUCLEATED RBC: 0 K/CU MM
TOTAL PROTEIN: 6.9 GM/DL (ref 6.4–8.2)
TOTAL PROTEIN: 7.5 GM/DL (ref 6.4–8.2)
VITAMIN B-12: 604.8 PG/ML (ref 211–911)
WBC # BLD: 4.5 K/CU MM (ref 4–10.5)
WBC # BLD: 4.6 K/CU MM (ref 4–10.5)

## 2023-06-19 PROCEDURE — 6360000004 HC RX CONTRAST MEDICATION: Performed by: EMERGENCY MEDICINE

## 2023-06-19 PROCEDURE — 6370000000 HC RX 637 (ALT 250 FOR IP): Performed by: EMERGENCY MEDICINE

## 2023-06-19 PROCEDURE — 82728 ASSAY OF FERRITIN: CPT

## 2023-06-19 PROCEDURE — 82746 ASSAY OF FOLIC ACID SERUM: CPT

## 2023-06-19 PROCEDURE — 83010 ASSAY OF HAPTOGLOBIN QUANT: CPT

## 2023-06-19 PROCEDURE — 85025 COMPLETE CBC W/AUTO DIFF WBC: CPT

## 2023-06-19 PROCEDURE — 85045 AUTOMATED RETICULOCYTE COUNT: CPT

## 2023-06-19 PROCEDURE — 80053 COMPREHEN METABOLIC PANEL: CPT

## 2023-06-19 PROCEDURE — 1200000000 HC SEMI PRIVATE

## 2023-06-19 PROCEDURE — 36415 COLL VENOUS BLD VENIPUNCTURE: CPT

## 2023-06-19 PROCEDURE — 83735 ASSAY OF MAGNESIUM: CPT

## 2023-06-19 PROCEDURE — 2580000003 HC RX 258: Performed by: EMERGENCY MEDICINE

## 2023-06-19 PROCEDURE — 6360000002 HC RX W HCPCS: Performed by: EMERGENCY MEDICINE

## 2023-06-19 PROCEDURE — 74177 CT ABD & PELVIS W/CONTRAST: CPT

## 2023-06-19 PROCEDURE — 99285 EMERGENCY DEPT VISIT HI MDM: CPT

## 2023-06-19 PROCEDURE — 93005 ELECTROCARDIOGRAM TRACING: CPT | Performed by: EMERGENCY MEDICINE

## 2023-06-19 PROCEDURE — 99204 OFFICE O/P NEW MOD 45 MIN: CPT | Performed by: NURSE PRACTITIONER

## 2023-06-19 PROCEDURE — 82607 VITAMIN B-12: CPT

## 2023-06-19 PROCEDURE — 84703 CHORIONIC GONADOTROPIN ASSAY: CPT

## 2023-06-19 RX ORDER — SODIUM CHLORIDE, SODIUM LACTATE, POTASSIUM CHLORIDE, CALCIUM CHLORIDE 600; 310; 30; 20 MG/100ML; MG/100ML; MG/100ML; MG/100ML
INJECTION, SOLUTION INTRAVENOUS CONTINUOUS
Status: DISCONTINUED | OUTPATIENT
Start: 2023-06-20 | End: 2023-06-20

## 2023-06-19 RX ORDER — ONDANSETRON 2 MG/ML
4 INJECTION INTRAMUSCULAR; INTRAVENOUS EVERY 6 HOURS PRN
Status: DISCONTINUED | OUTPATIENT
Start: 2023-06-19 | End: 2023-06-20

## 2023-06-19 RX ORDER — ONDANSETRON 4 MG/1
4 TABLET, FILM COATED ORAL DAILY PRN
Qty: 30 TABLET | Refills: 2 | Status: SHIPPED | OUTPATIENT
Start: 2023-06-19

## 2023-06-19 RX ORDER — OMEPRAZOLE 40 MG/1
40 CAPSULE, DELAYED RELEASE ORAL
Qty: 90 CAPSULE | Refills: 3 | Status: ON HOLD
Start: 2023-06-19 | End: 2023-06-21 | Stop reason: HOSPADM

## 2023-06-19 RX ORDER — PANTOPRAZOLE SODIUM 40 MG/10ML
40 INJECTION, POWDER, LYOPHILIZED, FOR SOLUTION INTRAVENOUS DAILY
Status: DISCONTINUED | OUTPATIENT
Start: 2023-06-20 | End: 2023-06-20

## 2023-06-19 RX ORDER — POTASSIUM CHLORIDE 7.45 MG/ML
10 INJECTION INTRAVENOUS
Status: DISPENSED | OUTPATIENT
Start: 2023-06-20 | End: 2023-06-20

## 2023-06-19 RX ORDER — 0.9 % SODIUM CHLORIDE 0.9 %
1000 INTRAVENOUS SOLUTION INTRAVENOUS ONCE
Status: COMPLETED | OUTPATIENT
Start: 2023-06-19 | End: 2023-06-19

## 2023-06-19 RX ORDER — POTASSIUM CHLORIDE 7.45 MG/ML
10 INJECTION INTRAVENOUS
Status: DISPENSED | OUTPATIENT
Start: 2023-06-19 | End: 2023-06-19

## 2023-06-19 RX ADMIN — POTASSIUM BICARBONATE 40 MEQ: 782 TABLET, EFFERVESCENT ORAL at 18:27

## 2023-06-19 RX ADMIN — IOPAMIDOL 75 ML: 755 INJECTION, SOLUTION INTRAVENOUS at 22:30

## 2023-06-19 RX ADMIN — POTASSIUM CHLORIDE 10 MEQ: 7.46 INJECTION, SOLUTION INTRAVENOUS at 20:55

## 2023-06-19 RX ADMIN — SODIUM CHLORIDE 1000 ML: 9 INJECTION, SOLUTION INTRAVENOUS at 20:55

## 2023-06-19 ASSESSMENT — ENCOUNTER SYMPTOMS
BACK PAIN: 1
CONSTIPATION: 0
PHOTOPHOBIA: 0
COLOR CHANGE: 0
NAUSEA: 1
WHEEZING: 0
DIARRHEA: 0
SHORTNESS OF BREATH: 1
VOMITING: 1
BLOOD IN STOOL: 0
COUGH: 0
ABDOMINAL PAIN: 0
EYE PAIN: 0

## 2023-06-19 ASSESSMENT — PAIN SCALES - GENERAL: PAINLEVEL_OUTOF10: 0

## 2023-06-19 ASSESSMENT — PAIN - FUNCTIONAL ASSESSMENT: PAIN_FUNCTIONAL_ASSESSMENT: 0-10

## 2023-06-19 NOTE — ED TRIAGE NOTES
Pt stating has been on prednisone for long time, stopped and then PCP started metformin due to A1C, and ever science has been vomiting, stopped taking metformin three weeks ago because could not keep anything in and have lost 30-40 pounds. Seen by gastrology today for the current complain and was sent to ED due to abnormal potassium levels.

## 2023-06-19 NOTE — PROGRESS NOTES
Tena Morrison 41 y.o. female was seen by KITTY Angulo on 06/19/23     Wt Readings from Last 3 Encounters:   06/19/23 140 lb (63.5 kg)   05/22/23 154 lb 9.6 oz (70.1 kg)   04/12/23 172 lb 6.4 oz (78.2 kg)       HPI  Hector Varela is a pleasant 41 y.o. female who presents today for anemia, nausea, and vomiting. She has a past medical history of abnormal uterine bleeding, anemia, and rheumatoid arthritis. She recalled taking Ibuprofen 1000 mg every four to five hours for one year due to pain in her joints from arthritis. She stopped taking Ibuprofen a month ago. Her appetite is poor with early satiety. Her weight is down. She mentioned in the last month and a half having daily nausea. Around that time she was started on Apremilast for RA monitored by Dr. Jayy Pedersen. She has never had an EGD or colonoscopy. She has fatigue and shortness of breath with weight loss for the last month. She was noted to have anemia. Per chart review lab work on 9-8-2020 RBC 3.57, Hgb 11.0, Hct 36.4,  and Platelets 461. Her lab work done on 5- showed WBC 6.6, RBC 4.29, Hgb 11.5, Hct 33.9, MCV 79 and Platelets. Lipase 26, Iron 49, TIBC 281, Iron saturation 17%. She has severe nausea and mentioned anything she eats will not stay down. She has not had a real meal in the last two months. Sipping on water and liquids only. Vomiting bile with last episode two days ago. Today dry heaving. No hematemesis. No abdominal pain, bloating or distention. She has more hiccups. No symptoms of heartburn or acid reflux. She used to take Prilosec but has not been taking for awhile. No nocturnal awakenings with acid reflux. No dysphagia or pain with swallowing. No excess belching or flatulence. She denies changes in her bowel pattern. Her typical bowel pattern is every two days with loose to mushy brown stools. No diarrhea or constipation. No blood in her stools or melena.   No family history of

## 2023-06-19 NOTE — ED PROVIDER NOTES
2. Hypokalemia      Disposition referral (if applicable):  No follow-up provider specified. Disposition medications (if applicable):  New Prescriptions    No medications on file       Comment: Please note this report has been produced using speech recognition software and may contain errors related to that system including errors in grammar, punctuation, and spelling, as well as words and phrases that may be inappropriate. If there are any questions or concerns please feel free to contact the dictating provider for clarification.         Leyla Luna MD  06/19/23 [id number]

## 2023-06-19 NOTE — CARE COORDINATION
MCG criteria for Hypokalemia reviewed at this time, criteria supports Inpatient Admission.  KIERA,RN/CM

## 2023-06-20 ENCOUNTER — ANESTHESIA (OUTPATIENT)
Dept: ENDOSCOPY | Age: 48
DRG: 391 | End: 2023-06-20
Payer: COMMERCIAL

## 2023-06-20 ENCOUNTER — ANESTHESIA EVENT (OUTPATIENT)
Dept: ENDOSCOPY | Age: 48
DRG: 391 | End: 2023-06-20
Payer: COMMERCIAL

## 2023-06-20 PROBLEM — E44.0 MODERATE MALNUTRITION (HCC): Status: ACTIVE | Noted: 2023-06-20

## 2023-06-20 LAB
ANION GAP SERPL CALCULATED.3IONS-SCNC: 11 MMOL/L (ref 4–16)
ANION GAP SERPL CALCULATED.3IONS-SCNC: 12 MMOL/L (ref 4–16)
ANION GAP SERPL CALCULATED.3IONS-SCNC: 13 MMOL/L (ref 4–16)
BASOPHILS ABSOLUTE: 0 K/CU MM
BASOPHILS RELATIVE PERCENT: 0.9 % (ref 0–1)
BUN SERPL-MCNC: 5 MG/DL (ref 6–23)
BUN SERPL-MCNC: 5 MG/DL (ref 6–23)
BUN SERPL-MCNC: 8 MG/DL (ref 6–23)
CALCIUM SERPL-MCNC: 8.1 MG/DL (ref 8.3–10.6)
CALCIUM SERPL-MCNC: 8.1 MG/DL (ref 8.3–10.6)
CALCIUM SERPL-MCNC: 8.4 MG/DL (ref 8.3–10.6)
CHLORIDE BLD-SCNC: 100 MMOL/L (ref 99–110)
CHLORIDE BLD-SCNC: 102 MMOL/L (ref 99–110)
CHLORIDE BLD-SCNC: 102 MMOL/L (ref 99–110)
CO2: 24 MMOL/L (ref 21–32)
CO2: 25 MMOL/L (ref 21–32)
CO2: 26 MMOL/L (ref 21–32)
CREAT SERPL-MCNC: 0.6 MG/DL (ref 0.6–1.1)
CREAT SERPL-MCNC: 0.7 MG/DL (ref 0.6–1.1)
CREAT SERPL-MCNC: 0.7 MG/DL (ref 0.6–1.1)
DIFFERENTIAL TYPE: ABNORMAL
EOSINOPHILS ABSOLUTE: 0.1 K/CU MM
EOSINOPHILS RELATIVE PERCENT: 2.5 % (ref 0–3)
GFR SERPL CREATININE-BSD FRML MDRD: >60 ML/MIN/1.73M2
GLUCOSE BLD-MCNC: 69 MG/DL (ref 70–99)
GLUCOSE BLD-MCNC: 73 MG/DL (ref 70–99)
GLUCOSE BLD-MCNC: 85 MG/DL (ref 70–99)
GLUCOSE BLD-MCNC: 87 MG/DL (ref 70–99)
GLUCOSE BLD-MCNC: 89 MG/DL (ref 70–99)
GLUCOSE BLD-MCNC: 89 MG/DL (ref 70–99)
GLUCOSE BLD-MCNC: 90 MG/DL (ref 70–99)
GLUCOSE SERPL-MCNC: 84 MG/DL (ref 70–99)
GLUCOSE SERPL-MCNC: 85 MG/DL (ref 70–99)
GLUCOSE SERPL-MCNC: 93 MG/DL (ref 70–99)
HAPTOGLOB SERPL-MCNC: 193 MG/DL (ref 30–200)
HCT VFR BLD CALC: 32.3 % (ref 37–47)
HEMOGLOBIN: 10.8 GM/DL (ref 12.5–16)
IMMATURE NEUTROPHIL %: 0.2 % (ref 0–0.43)
INR BLD: 1.13 INDEX
LIPASE: 35 IU/L (ref 13–60)
LYMPHOCYTES ABSOLUTE: 1.4 K/CU MM
LYMPHOCYTES RELATIVE PERCENT: 31.9 % (ref 24–44)
MAGNESIUM: 1.8 MG/DL (ref 1.8–2.4)
MAGNESIUM: 2 MG/DL (ref 1.8–2.4)
MCH RBC QN AUTO: 27.1 PG (ref 27–31)
MCHC RBC AUTO-ENTMCNC: 33.4 % (ref 32–36)
MCV RBC AUTO: 81 FL (ref 78–100)
MONOCYTES ABSOLUTE: 0.4 K/CU MM
MONOCYTES RELATIVE PERCENT: 9.2 % (ref 0–4)
NUCLEATED RBC %: 0 %
PDW BLD-RTO: 14.6 % (ref 11.7–14.9)
PLATELET # BLD: 245 K/CU MM (ref 140–440)
PMV BLD AUTO: 10.2 FL (ref 7.5–11.1)
POTASSIUM SERPL-SCNC: 2.9 MMOL/L (ref 3.5–5.1)
POTASSIUM SERPL-SCNC: 3.4 MMOL/L (ref 3.5–5.1)
POTASSIUM SERPL-SCNC: 3.4 MMOL/L (ref 3.5–5.1)
POTASSIUM SERPL-SCNC: 3.5 MMOL/L (ref 3.5–5.1)
PROTHROMBIN TIME: 14.4 SECONDS (ref 11.7–14.5)
RBC # BLD: 3.99 M/CU MM (ref 4.2–5.4)
SEGMENTED NEUTROPHILS ABSOLUTE COUNT: 2.5 K/CU MM
SEGMENTED NEUTROPHILS RELATIVE PERCENT: 55.3 % (ref 36–66)
SODIUM BLD-SCNC: 138 MMOL/L (ref 135–145)
SODIUM BLD-SCNC: 138 MMOL/L (ref 135–145)
SODIUM BLD-SCNC: 139 MMOL/L (ref 135–145)
TOTAL IMMATURE NEUTOROPHIL: 0.01 K/CU MM
TOTAL NUCLEATED RBC: 0 K/CU MM
TROPONIN T: <0.01 NG/ML
TSH SERPL DL<=0.005 MIU/L-ACNC: 1.8 UIU/ML (ref 0.27–4.2)
WBC # BLD: 4.5 K/CU MM (ref 4–10.5)

## 2023-06-20 PROCEDURE — 2580000003 HC RX 258: Performed by: STUDENT IN AN ORGANIZED HEALTH CARE EDUCATION/TRAINING PROGRAM

## 2023-06-20 PROCEDURE — 1200000000 HC SEMI PRIVATE

## 2023-06-20 PROCEDURE — 43239 EGD BIOPSY SINGLE/MULTIPLE: CPT | Performed by: INTERNAL MEDICINE

## 2023-06-20 PROCEDURE — 3700000000 HC ANESTHESIA ATTENDED CARE: Performed by: INTERNAL MEDICINE

## 2023-06-20 PROCEDURE — 76937 US GUIDE VASCULAR ACCESS: CPT

## 2023-06-20 PROCEDURE — 3609012400 HC EGD TRANSORAL BIOPSY SINGLE/MULTIPLE: Performed by: INTERNAL MEDICINE

## 2023-06-20 PROCEDURE — 83690 ASSAY OF LIPASE: CPT

## 2023-06-20 PROCEDURE — A4216 STERILE WATER/SALINE, 10 ML: HCPCS | Performed by: STUDENT IN AN ORGANIZED HEALTH CARE EDUCATION/TRAINING PROGRAM

## 2023-06-20 PROCEDURE — 94761 N-INVAS EAR/PLS OXIMETRY MLT: CPT

## 2023-06-20 PROCEDURE — 3700000001 HC ADD 15 MINUTES (ANESTHESIA): Performed by: INTERNAL MEDICINE

## 2023-06-20 PROCEDURE — 6370000000 HC RX 637 (ALT 250 FOR IP): Performed by: STUDENT IN AN ORGANIZED HEALTH CARE EDUCATION/TRAINING PROGRAM

## 2023-06-20 PROCEDURE — 85025 COMPLETE CBC W/AUTO DIFF WBC: CPT

## 2023-06-20 PROCEDURE — 83036 HEMOGLOBIN GLYCOSYLATED A1C: CPT

## 2023-06-20 PROCEDURE — 36410 VNPNXR 3YR/> PHY/QHP DX/THER: CPT

## 2023-06-20 PROCEDURE — 85610 PROTHROMBIN TIME: CPT

## 2023-06-20 PROCEDURE — 84484 ASSAY OF TROPONIN QUANT: CPT

## 2023-06-20 PROCEDURE — 6360000002 HC RX W HCPCS: Performed by: NURSE ANESTHETIST, CERTIFIED REGISTERED

## 2023-06-20 PROCEDURE — 99222 1ST HOSP IP/OBS MODERATE 55: CPT | Performed by: INTERNAL MEDICINE

## 2023-06-20 PROCEDURE — 88342 IMHCHEM/IMCYTCHM 1ST ANTB: CPT | Performed by: PATHOLOGY

## 2023-06-20 PROCEDURE — C9113 INJ PANTOPRAZOLE SODIUM, VIA: HCPCS | Performed by: STUDENT IN AN ORGANIZED HEALTH CARE EDUCATION/TRAINING PROGRAM

## 2023-06-20 PROCEDURE — 80048 BASIC METABOLIC PNL TOTAL CA: CPT

## 2023-06-20 PROCEDURE — 83735 ASSAY OF MAGNESIUM: CPT

## 2023-06-20 PROCEDURE — 84443 ASSAY THYROID STIM HORMONE: CPT

## 2023-06-20 PROCEDURE — 36415 COLL VENOUS BLD VENIPUNCTURE: CPT

## 2023-06-20 PROCEDURE — 82962 GLUCOSE BLOOD TEST: CPT

## 2023-06-20 PROCEDURE — 88305 TISSUE EXAM BY PATHOLOGIST: CPT | Performed by: PATHOLOGY

## 2023-06-20 PROCEDURE — 0DB68ZX EXCISION OF STOMACH, VIA NATURAL OR ARTIFICIAL OPENING ENDOSCOPIC, DIAGNOSTIC: ICD-10-PCS | Performed by: INTERNAL MEDICINE

## 2023-06-20 PROCEDURE — 6360000002 HC RX W HCPCS: Performed by: STUDENT IN AN ORGANIZED HEALTH CARE EDUCATION/TRAINING PROGRAM

## 2023-06-20 PROCEDURE — 2709999900 HC NON-CHARGEABLE SUPPLY: Performed by: INTERNAL MEDICINE

## 2023-06-20 PROCEDURE — 05HB33Z INSERTION OF INFUSION DEVICE INTO RIGHT BASILIC VEIN, PERCUTANEOUS APPROACH: ICD-10-PCS | Performed by: STUDENT IN AN ORGANIZED HEALTH CARE EDUCATION/TRAINING PROGRAM

## 2023-06-20 PROCEDURE — 84132 ASSAY OF SERUM POTASSIUM: CPT

## 2023-06-20 PROCEDURE — 2500000003 HC RX 250 WO HCPCS: Performed by: INTERNAL MEDICINE

## 2023-06-20 RX ORDER — PROPOFOL 10 MG/ML
INJECTION, EMULSION INTRAVENOUS PRN
Status: DISCONTINUED | OUTPATIENT
Start: 2023-06-20 | End: 2023-06-20 | Stop reason: SDUPTHER

## 2023-06-20 RX ORDER — SODIUM CHLORIDE 9 MG/ML
INJECTION, SOLUTION INTRAVENOUS PRN
Status: DISCONTINUED | OUTPATIENT
Start: 2023-06-20 | End: 2023-06-21 | Stop reason: HOSPADM

## 2023-06-20 RX ORDER — ONDANSETRON 4 MG/1
4 TABLET, ORALLY DISINTEGRATING ORAL EVERY 8 HOURS PRN
Status: DISCONTINUED | OUTPATIENT
Start: 2023-06-20 | End: 2023-06-21 | Stop reason: HOSPADM

## 2023-06-20 RX ORDER — PANTOPRAZOLE SODIUM 40 MG/10ML
40 INJECTION, POWDER, LYOPHILIZED, FOR SOLUTION INTRAVENOUS ONCE
Status: DISCONTINUED | OUTPATIENT
Start: 2023-06-20 | End: 2023-06-20

## 2023-06-20 RX ORDER — GLUCAGON 1 MG/ML
1 KIT INJECTION PRN
Status: DISCONTINUED | OUTPATIENT
Start: 2023-06-20 | End: 2023-06-21 | Stop reason: HOSPADM

## 2023-06-20 RX ORDER — POTASSIUM CHLORIDE 7.45 MG/ML
10 INJECTION INTRAVENOUS PRN
Status: DISCONTINUED | OUTPATIENT
Start: 2023-06-20 | End: 2023-06-21 | Stop reason: HOSPADM

## 2023-06-20 RX ORDER — ONDANSETRON 2 MG/ML
4 INJECTION INTRAMUSCULAR; INTRAVENOUS EVERY 6 HOURS PRN
Status: DISCONTINUED | OUTPATIENT
Start: 2023-06-20 | End: 2023-06-21 | Stop reason: HOSPADM

## 2023-06-20 RX ORDER — ACETAMINOPHEN 650 MG/1
650 SUPPOSITORY RECTAL EVERY 6 HOURS PRN
Status: DISCONTINUED | OUTPATIENT
Start: 2023-06-20 | End: 2023-06-21 | Stop reason: HOSPADM

## 2023-06-20 RX ORDER — ACETAMINOPHEN 325 MG/1
650 TABLET ORAL EVERY 6 HOURS PRN
Status: DISCONTINUED | OUTPATIENT
Start: 2023-06-20 | End: 2023-06-21 | Stop reason: HOSPADM

## 2023-06-20 RX ORDER — POTASSIUM CHLORIDE 7.45 MG/ML
10 INJECTION INTRAVENOUS
Status: COMPLETED | OUTPATIENT
Start: 2023-06-20 | End: 2023-06-20

## 2023-06-20 RX ORDER — PANTOPRAZOLE SODIUM 40 MG/1
40 TABLET, DELAYED RELEASE ORAL
Status: DISCONTINUED | OUTPATIENT
Start: 2023-06-20 | End: 2023-06-21 | Stop reason: HOSPADM

## 2023-06-20 RX ORDER — MAGNESIUM SULFATE IN WATER 40 MG/ML
2000 INJECTION, SOLUTION INTRAVENOUS PRN
Status: DISCONTINUED | OUTPATIENT
Start: 2023-06-20 | End: 2023-06-21 | Stop reason: HOSPADM

## 2023-06-20 RX ORDER — LIDOCAINE HYDROCHLORIDE 20 MG/ML
INJECTION, SOLUTION INTRAVENOUS PRN
Status: DISCONTINUED | OUTPATIENT
Start: 2023-06-20 | End: 2023-06-20 | Stop reason: SDUPTHER

## 2023-06-20 RX ORDER — SODIUM CHLORIDE 0.9 % (FLUSH) 0.9 %
5-40 SYRINGE (ML) INJECTION EVERY 12 HOURS SCHEDULED
Status: DISCONTINUED | OUTPATIENT
Start: 2023-06-20 | End: 2023-06-21 | Stop reason: HOSPADM

## 2023-06-20 RX ORDER — POTASSIUM CHLORIDE 20 MEQ/1
40 TABLET, EXTENDED RELEASE ORAL EVERY 4 HOURS
Status: DISCONTINUED | OUTPATIENT
Start: 2023-06-20 | End: 2023-06-20

## 2023-06-20 RX ORDER — CALCIUM ACETATE 667 MG/1
1 CAPSULE ORAL
Status: DISCONTINUED | OUTPATIENT
Start: 2023-06-20 | End: 2023-06-21 | Stop reason: HOSPADM

## 2023-06-20 RX ORDER — ENOXAPARIN SODIUM 100 MG/ML
40 INJECTION SUBCUTANEOUS EVERY EVENING
Status: DISCONTINUED | OUTPATIENT
Start: 2023-06-20 | End: 2023-06-21 | Stop reason: HOSPADM

## 2023-06-20 RX ORDER — INSULIN LISPRO 100 [IU]/ML
0-4 INJECTION, SOLUTION INTRAVENOUS; SUBCUTANEOUS
Status: DISCONTINUED | OUTPATIENT
Start: 2023-06-20 | End: 2023-06-21 | Stop reason: HOSPADM

## 2023-06-20 RX ORDER — DEXTROSE MONOHYDRATE 100 MG/ML
INJECTION, SOLUTION INTRAVENOUS CONTINUOUS PRN
Status: DISCONTINUED | OUTPATIENT
Start: 2023-06-20 | End: 2023-06-21 | Stop reason: HOSPADM

## 2023-06-20 RX ORDER — SODIUM CHLORIDE 0.9 % (FLUSH) 0.9 %
5-40 SYRINGE (ML) INJECTION PRN
Status: DISCONTINUED | OUTPATIENT
Start: 2023-06-20 | End: 2023-06-21 | Stop reason: HOSPADM

## 2023-06-20 RX ORDER — INSULIN LISPRO 100 [IU]/ML
0-4 INJECTION, SOLUTION INTRAVENOUS; SUBCUTANEOUS NIGHTLY
Status: DISCONTINUED | OUTPATIENT
Start: 2023-06-20 | End: 2023-06-21 | Stop reason: HOSPADM

## 2023-06-20 RX ADMIN — SODIUM CHLORIDE, POTASSIUM CHLORIDE, SODIUM LACTATE AND CALCIUM CHLORIDE: 600; 310; 30; 20 INJECTION, SOLUTION INTRAVENOUS at 03:11

## 2023-06-20 RX ADMIN — CALCIUM ACETATE 667 MG: 667 CAPSULE ORAL at 13:49

## 2023-06-20 RX ADMIN — LIDOCAINE HYDROCHLORIDE 150 MG: 20 INJECTION, SOLUTION INTRAVENOUS at 13:05

## 2023-06-20 RX ADMIN — SODIUM CHLORIDE, POTASSIUM CHLORIDE, SODIUM LACTATE AND CALCIUM CHLORIDE: 600; 310; 30; 20 INJECTION, SOLUTION INTRAVENOUS at 01:27

## 2023-06-20 RX ADMIN — ENOXAPARIN SODIUM 40 MG: 100 INJECTION SUBCUTANEOUS at 17:16

## 2023-06-20 RX ADMIN — ALUMINUM HYDROXIDE, MAGNESIUM HYDROXIDE, AND SIMETHICONE: 200; 200; 20 SUSPENSION ORAL at 01:25

## 2023-06-20 RX ADMIN — POTASSIUM CHLORIDE 10 MEQ: 7.46 INJECTION, SOLUTION INTRAVENOUS at 06:58

## 2023-06-20 RX ADMIN — SODIUM CHLORIDE 40 MG: 9 INJECTION INTRAMUSCULAR; INTRAVENOUS; SUBCUTANEOUS at 01:25

## 2023-06-20 RX ADMIN — POTASSIUM CHLORIDE 40 MEQ: 1500 TABLET, EXTENDED RELEASE ORAL at 10:06

## 2023-06-20 RX ADMIN — POTASSIUM CHLORIDE 10 MEQ: 7.45 INJECTION INTRAVENOUS at 03:16

## 2023-06-20 RX ADMIN — PANTOPRAZOLE SODIUM 40 MG: 40 TABLET, DELAYED RELEASE ORAL at 05:55

## 2023-06-20 RX ADMIN — SODIUM CHLORIDE, POTASSIUM CHLORIDE, SODIUM LACTATE AND CALCIUM CHLORIDE: 600; 310; 30; 20 INJECTION, SOLUTION INTRAVENOUS at 04:49

## 2023-06-20 RX ADMIN — POTASSIUM CHLORIDE 10 MEQ: 7.46 INJECTION, SOLUTION INTRAVENOUS at 04:51

## 2023-06-20 RX ADMIN — POTASSIUM CHLORIDE 10 MEQ: 7.46 INJECTION, SOLUTION INTRAVENOUS at 05:52

## 2023-06-20 RX ADMIN — PANTOPRAZOLE SODIUM 40 MG: 40 INJECTION, POWDER, FOR SOLUTION INTRAVENOUS at 03:00

## 2023-06-20 RX ADMIN — CALCIUM ACETATE 667 MG: 667 CAPSULE ORAL at 17:16

## 2023-06-20 RX ADMIN — PROPOFOL 80 MG: 10 INJECTION, EMULSION INTRAVENOUS at 13:12

## 2023-06-20 ASSESSMENT — PAIN - FUNCTIONAL ASSESSMENT: PAIN_FUNCTIONAL_ASSESSMENT: 0-10

## 2023-06-20 ASSESSMENT — LIFESTYLE VARIABLES: SMOKING_STATUS: 0

## 2023-06-20 NOTE — ED NOTES
Report received from [facility]. Assumed care @ this time.       Adonis Banegas RN  06/19/23 1031

## 2023-06-20 NOTE — ED NOTES
Care and report given to [facility], RN  06/19/23 5915

## 2023-06-20 NOTE — CONSULTS
Consult completed. Extended Dwell Introcan DV 20g, 2.5 inch catheter inserted via ultrasound in patient's Right Basilic vein. Brisk blood return noted and catheter flushes with ease. Patient tolerated well. Evelina Smith, primary RN notified. Consult IV/PICC team if patient's needs change.
Consult completed. PIV placed by ED RN. Pt has patent IV access and therapeutic needs met.
Vitals:  BP (!) 99/57   Pulse 66   Temp 97.6 °F (36.4 °C) (Oral)   Resp 16   Ht 5' 4\" (1.626 m)   Wt 141 lb 8 oz (64.2 kg)   SpO2 97%   BMI 24.29 kg/m²   CONSTITUTIONAL: alert, cooperative, no apparent distress,   EYES:  pupils equal, round and reactive to light and sclera clear  ENT:  normocepalic, without obvious abnormality  NECK:  supple, symmetrical, trachea midline  HEMATOLOGIC/LYMPHATICS:  no cervical lymphadenopathy and no supraclavicular lymphadenopathy  LUNGS:  clear to auscultation  CARDIOVASCULAR:  regular rate and rhythm and no murmur noted  ABDOMEN:  Soft, non-tender with normal bowel sounds. No organomegaly or masses  NEUROLOGIC: no focal deficit detected  SKIN:  no lesions  EXTREMITIES: no clubbing, cyanosis, or edema    IMPRESSION:  1) Weight loss and vomiting unclear etiology. 2) Hypokalemia from vomiting. RECOMMENDATIONS:  1) Correct potassium level, when adequate will schedule for EGD. Keep NPO.            Electronically signed by Lia Crum MD on 6/20/2023 at 10:18 AM

## 2023-06-20 NOTE — ANESTHESIA PRE PROCEDURE
GI/Hepatic/Renal:   (+) GERD: poorly controlled,           Endo/Other:    (+) DiabetesType II DM, , blood dyscrasia: anemia, arthritis:., .                 Abdominal: normal exam            Vascular: Other Findings:           Anesthesia Plan      MAC     ASA 2       Induction: intravenous. Anesthetic plan and risks discussed with patient. Plan discussed with attending.                     ASHLEY Medina - YANELI   6/20/2023

## 2023-06-20 NOTE — CARE COORDINATION
Chart reviewed. From home, independent prior to admission. Has PCP and insurance. No needs identified at this time. CM available should any new needs arise.

## 2023-06-20 NOTE — H&P
History and Physical      Name:  Tacho James /Age/Sex: 1975  (41 y.o. female)   MRN & CSN:  [id number] & [id number] Encounter Date/Time: 2023 11:39 PM EDT   Location:  ED22/ED-22 PCP: Royal Cr MD       Hospital Day: 1    Assessment and Plan:     Patient is a 30-year-old female who presented with nausea. # Weight loss and hypokalemia secondary to intractable nausea and vomiting  - Endorsed constant nausea and non-bloody emesis for past 3 months. Unable to tolerate any PO for few weeks, thus leading to over 60 lbs weight loss in 7 months. Was seen by GI day of admission with plan for EGD/colonoscopy, labs done in clinic showed hypokalemia thus advised to present to ED. Minimal NSAID use. No known hx of gastroparesis. - Clinically very hypovolemic. Labs with moderate hypochloremia and hypokalemia consistent with hx. CT unremarkable. - Supportive care with IVF, antiemetics, PPI and GI cocktail. GI consulted for possible EGD, keep NPO. Labs ordered. Can be secondary to ? Apremilast.     # T2DM with hyperglycemia  - Last A1c 6.5% in 2023, down from 10.0 in 2023, after initiation of Metformin and N/V.   - Held Metformin.  - LCSI. # GERD  - Continue PPI. # RA  - Currently on Apremilast which has not taken in 3 dys. Checklist:  Advanced directive: full  Diet: NPO pending GI evaluation  DVT ppx: Lovenox    Disposition: admit to inpatient. Estimated discharge: 2-3 day(s). Current living situation: home. Expected disposition: home. Spoke with ED provider who recommended admission for the patient and I agree with that plan. Personally reviewed lab studies and imaging. EKG interpreted personally and results as stated above. Imaging that was interpreted personally and results as stated above.     History of Present Illness:     Chief Complaint: low potassium    Patient is a 30-year-old female with a PMHx of RA who presented to the ED with constant nausea and non-bloody

## 2023-06-20 NOTE — BRIEF OP NOTE
Brief Postoperative Note      Patient: Lisa Chowdary  YOB: 1975  MRN: [id number]    Date of Procedure: 6/20/2023    Pre-Op Diagnosis Codes:     * Nausea and vomiting, unspecified vomiting type [R11.2]     * Weight loss [R63.4]    Post-Op Diagnosis: Gastritis in fundus, body and antrum. Bx taken to check for h pylori. Procedure(s):  EGD BIOPSY    Surgeon(s):  Brayan Armijo MD    Assistant:  * No surgical staff found *    Anesthesia: Monitor Anesthesia Care    Estimated Blood Loss (mL): Minimal    Complications: None    Specimens:   ID Type Source Tests Collected by Time Destination   A : REGULAR FORCEPS  Tissue Stomach SURGICAL PATHOLOGY Brayan Armijo MD 6/20/2023 1310        Implants:  * No implants in log *      Drains: * No LDAs found *    Findings: As above, await bx, if h pylori treat accordingly.         Electronically signed by Brayan Armijo MD on 6/20/2023 at 1:15 PM

## 2023-06-20 NOTE — ED NOTES
ED TO INPATIENT SBAR HANDOFF    Patient Name: Sajan Simms   :  1975  41 y.o. Preferred Name  Hanna  Family/Caregiver Present yes   Restraints no   C-SSRS: Risk of Suicide: No Risk  Sitter no   Sepsis Risk Score Sepsis Risk Score: 0.6      Situation  Chief Complaint   Patient presents with    Abnormal Lab     Low k+    Nausea    Emesis     Weight loss     Brief Description of Patient's Condition: Nausea and vomiting, low potassium.   Mental Status: oriented  Arrived from: home    Imaging:   CT ABDOMEN PELVIS W IV CONTRAST Additional Contrast? None    (Results Pending)     Abnormal labs:   Abnormal Labs Reviewed   CBC WITH AUTO DIFFERENTIAL - Abnormal; Notable for the following components:       Result Value    Hemoglobin 11.5 (*)     Hematocrit 35.0 (*)     MCH 26.5 (*)     Monocytes % 10.2 (*)     All other components within normal limits   COMPREHENSIVE METABOLIC PANEL W/ REFLEX TO MG FOR LOW K - Abnormal; Notable for the following components:    Potassium 2.6 (*)     Chloride 95 (*)     Glucose 117 (*)     All other components within normal limits       Background  History:   Past Medical History:   Diagnosis Date    Abnormal uterine bleeding     pt is scheduled for thermachoice ablation 2013\"heavy bleeding for almost a year- getting worse\"\"have to wear a pad all the time- heavy to light but some kind of drainage every day\"    Anemia     Rheumatoid arthritis (HCC)        Assessment    Vitals/MEWS: MEWS Score: 1  Level of Consciousness: Alert (0)   Vitals:    23 2204 23 2232 23 2302 23 2355   BP: 112/77 106/72 (!) 94/58 104/76   Pulse: 67 70 67 74   Resp: 16   16   Temp: 98.1 °F (36.7 °C)   98.1 °F (36.7 °C)   TempSrc: Oral   Oral   SpO2: 100%   100%     PO Status: Regular  O2 Flow Rate:      Cardiac Rhythm: NSR  Last documented pain medication administered: na  NIH Score: NIH     Active LDA's:   Peripheral IV 23 Left Antecubital (Active)       Pertinent or High no

## 2023-06-21 VITALS
WEIGHT: 141.5 LBS | HEIGHT: 64 IN | RESPIRATION RATE: 16 BRPM | OXYGEN SATURATION: 95 % | SYSTOLIC BLOOD PRESSURE: 99 MMHG | DIASTOLIC BLOOD PRESSURE: 55 MMHG | BODY MASS INDEX: 24.16 KG/M2 | HEART RATE: 78 BPM | TEMPERATURE: 98.4 F

## 2023-06-21 LAB
ALBUMIN SERPL-MCNC: 3.8 GM/DL (ref 3.4–5)
ALP BLD-CCNC: 55 IU/L (ref 40–129)
ALT SERPL-CCNC: 25 U/L (ref 10–40)
ANION GAP SERPL CALCULATED.3IONS-SCNC: 16 MMOL/L (ref 4–16)
AST SERPL-CCNC: 29 IU/L (ref 15–37)
BASOPHILS ABSOLUTE: 0 K/CU MM
BASOPHILS RELATIVE PERCENT: 0.8 % (ref 0–1)
BILIRUB SERPL-MCNC: 0.6 MG/DL (ref 0–1)
BUN SERPL-MCNC: 5 MG/DL (ref 6–23)
CALCIUM SERPL-MCNC: 8.8 MG/DL (ref 8.3–10.6)
CHLORIDE BLD-SCNC: 102 MMOL/L (ref 99–110)
CO2: 20 MMOL/L (ref 21–32)
CREAT SERPL-MCNC: 0.7 MG/DL (ref 0.6–1.1)
DIFFERENTIAL TYPE: ABNORMAL
EKG ATRIAL RATE: 94 BPM
EKG DIAGNOSIS: NORMAL
EKG P AXIS: 66 DEGREES
EKG P-R INTERVAL: 124 MS
EKG Q-T INTERVAL: 352 MS
EKG QRS DURATION: 66 MS
EKG QTC CALCULATION (BAZETT): 440 MS
EKG R AXIS: 35 DEGREES
EKG T AXIS: 195 DEGREES
EKG VENTRICULAR RATE: 94 BPM
EOSINOPHILS ABSOLUTE: 0.1 K/CU MM
EOSINOPHILS RELATIVE PERCENT: 2.3 % (ref 0–3)
GFR SERPL CREATININE-BSD FRML MDRD: >60 ML/MIN/1.73M2
GLUCOSE BLD-MCNC: 113 MG/DL (ref 70–99)
GLUCOSE SERPL-MCNC: 108 MG/DL (ref 70–99)
HCT VFR BLD CALC: 33 % (ref 37–47)
HEMOGLOBIN: 10.8 GM/DL (ref 12.5–16)
IMMATURE NEUTROPHIL %: 0.2 % (ref 0–0.43)
LYMPHOCYTES ABSOLUTE: 1.7 K/CU MM
LYMPHOCYTES RELATIVE PERCENT: 36.3 % (ref 24–44)
MAGNESIUM: 1.7 MG/DL (ref 1.8–2.4)
MCH RBC QN AUTO: 26.5 PG (ref 27–31)
MCHC RBC AUTO-ENTMCNC: 32.7 % (ref 32–36)
MCV RBC AUTO: 81.1 FL (ref 78–100)
MONOCYTES ABSOLUTE: 0.4 K/CU MM
MONOCYTES RELATIVE PERCENT: 8 % (ref 0–4)
NUCLEATED RBC %: 0 %
PDW BLD-RTO: 14.9 % (ref 11.7–14.9)
PLATELET # BLD: 250 K/CU MM (ref 140–440)
PMV BLD AUTO: 10.2 FL (ref 7.5–11.1)
POTASSIUM SERPL-SCNC: 3.5 MMOL/L (ref 3.5–5.1)
RBC # BLD: 4.07 M/CU MM (ref 4.2–5.4)
SEGMENTED NEUTROPHILS ABSOLUTE COUNT: 2.5 K/CU MM
SEGMENTED NEUTROPHILS RELATIVE PERCENT: 52.4 % (ref 36–66)
SODIUM BLD-SCNC: 138 MMOL/L (ref 135–145)
TOTAL IMMATURE NEUTOROPHIL: 0.01 K/CU MM
TOTAL NUCLEATED RBC: 0 K/CU MM
TOTAL PROTEIN: 6.3 GM/DL (ref 6.4–8.2)
WBC # BLD: 4.7 K/CU MM (ref 4–10.5)

## 2023-06-21 PROCEDURE — 2580000003 HC RX 258: Performed by: STUDENT IN AN ORGANIZED HEALTH CARE EDUCATION/TRAINING PROGRAM

## 2023-06-21 PROCEDURE — 85025 COMPLETE CBC W/AUTO DIFF WBC: CPT

## 2023-06-21 PROCEDURE — 93010 ELECTROCARDIOGRAM REPORT: CPT | Performed by: INTERNAL MEDICINE

## 2023-06-21 PROCEDURE — 6370000000 HC RX 637 (ALT 250 FOR IP): Performed by: STUDENT IN AN ORGANIZED HEALTH CARE EDUCATION/TRAINING PROGRAM

## 2023-06-21 PROCEDURE — 82962 GLUCOSE BLOOD TEST: CPT

## 2023-06-21 PROCEDURE — 2500000003 HC RX 250 WO HCPCS: Performed by: INTERNAL MEDICINE

## 2023-06-21 PROCEDURE — 80053 COMPREHEN METABOLIC PANEL: CPT

## 2023-06-21 PROCEDURE — 94761 N-INVAS EAR/PLS OXIMETRY MLT: CPT

## 2023-06-21 PROCEDURE — 36415 COLL VENOUS BLD VENIPUNCTURE: CPT

## 2023-06-21 PROCEDURE — 83735 ASSAY OF MAGNESIUM: CPT

## 2023-06-21 RX ORDER — CALCIUM ACETATE 667 MG/1
1 CAPSULE ORAL
Qty: 90 CAPSULE | Refills: 0 | Status: SHIPPED | OUTPATIENT
Start: 2023-06-21 | End: 2023-07-21

## 2023-06-21 RX ORDER — PANTOPRAZOLE SODIUM 40 MG/1
40 TABLET, DELAYED RELEASE ORAL
Qty: 30 TABLET | Refills: 3 | Status: SHIPPED | OUTPATIENT
Start: 2023-06-22

## 2023-06-21 RX ADMIN — CALCIUM ACETATE 667 MG: 667 CAPSULE ORAL at 10:19

## 2023-06-21 RX ADMIN — PANTOPRAZOLE SODIUM 40 MG: 40 TABLET, DELAYED RELEASE ORAL at 06:42

## 2023-06-21 RX ADMIN — SODIUM CHLORIDE, PRESERVATIVE FREE 5 ML: 5 INJECTION INTRAVENOUS at 11:13

## 2023-06-21 NOTE — DISCHARGE INSTRUCTIONS
Continue with pantoprazole 40mg daily and follow up with Gastroenterology - Dr Lia Crum MD in 1-2 weeks for biopsy results and further recommendations,.

## 2023-06-21 NOTE — DISCHARGE SUMMARY
near the falciform ligament. GI/Bowel: There is no bowel dilatation or wall thickening identified. Normal appendix. Pelvis: No acute findings. Peritoneum/Retroperitoneum: No free air or free fluid. The aorta is normal in caliber. The visceral branches are patent. No lymphadenopathy. Bones/Soft Tissues: No abnormality identified. *Unless otherwise specified, incidental findings do not require dedicated imaging follow-up. No acute abnormality identified. EGD    Result Date: 2023  [facility] Patient: Modesto Diaz MRN: [id number] : 1975 Gender: Female Age: 41 Years Procedure: Upper GI endoscopy Date: 2023 Attending Physician: Matthew Martin MD:       Augustin Mays Indications: - Vomiting, weight loss. Medications:        -  See the Anesthesia note for documentation of the administered medications Complications:        -  No immediate complications. Estimated Blood Loss:        -  Estimated blood loss was minimal. Procedure:        - The scope was introduced through the mouth and advanced to the second part           of the duodenum.        -  The upper GI endoscopy was accomplished without difficulty. -  The patient tolerated the procedure well. Findings:        - Esophagus normal, ge junction, z line at 37 cm. Gastritis in fundus, body           and antrum, bx taken to check for h pylori. Duodenum normal. Impression:        - Esophagus normal, ge junction, z line at 37 cm. Gastritis in fundus, body           and antrum, bx taken to check for h pylori. Duodenum normal.        -  Specimen collected. Recommendation:        -  Patient has a contact number available for emergencies. The signs and           symptoms of potential delayed complications were discussed with the patient. Return to normal activities tomorrow. Written discharge instructions were           provided to the patient.  Procedure Code(s):        - [id number], independent

## 2023-06-23 ENCOUNTER — TELEPHONE (OUTPATIENT)
Dept: GASTROENTEROLOGY | Age: 48
End: 2023-06-23

## 2023-06-23 NOTE — PROGRESS NOTES
06/21/23 1104   Encounter Summary   Encounter Overview/Reason  Initial Encounter   Service Provided For: Patient   Referral/Consult From: [facility]   Support System Spouse   Last Encounter  06/21/23  (Patient sitting in chair, dressed, ready to be disharged. Good conversation and blessings given.)   Complexity of Encounter Low   Begin Time 1100   End Time  1106   Total Time Calculated 6 min   Encounter    Type Initial Screen/Assessment   Spiritual/Emotional needs   Type Spiritual Support   Assessment/Intervention/Outcome   Assessment Calm;Coping; Hopeful   Intervention Active listening;Nurtured Hope;Prayer (assurance of)/[place];Sustaining Presence/Ministry of presence   Outcome Comfort;Encouraged;Engaged in conversation;Expressed feelings of Misty, Peace and/or Love;Expressed Gratitude   Plan and Referrals   Plan/Referrals Continue Support (comment)  (Patient notified how to contact Jerry Solomon)
4 Eyes Skin Assessment     NAME:  Jerry Morrison  YOB: 1975  MEDICAL RECORD NUMBER:  [id number]    The patient is being assessed for  Admission    I agree that at least one RN has performed a thorough Head to Toe Skin Assessment on the patient. ALL assessment sites listed below have been assessed. Areas assessed by both nurses:    Head, Face, Ears, Shoulders, Back, Chest, Arms, Elbows, Hands, Sacrum. Buttock, Coccyx, Ischium, Legs. Feet and Heels, and Under Medical Devices         Does the Patient have a Wound?  No noted wound(s)       Noel Prevention initiated by RN: No  Wound Care Orders initiated by RN: No    Pressure Injury (Stage 3,4, Unstageable, DTI, NWPT, and Complex wounds) if present, place Wound referral order by RN under : No    New Ostomies, if present place, Ostomy referral order under : No     Nurse 1 eSignature: Electronically signed by Won Chery RN on 6/20/23 at 2:01 AM EDT    **SHARE this note so that the co-signing nurse can place an eSignature**    Nurse 2 eSignature: Electronically signed by Susana Cao LPN on 6/12/26 at 6:89 AM EDT
Comprehensive Nutrition Assessment    Type and Reason for Visit:  Initial, Positive Nutrition Screen (34+ lb wt loss, N+V)    Nutrition Recommendations/Plan:   Start oral diet or nutrition support when medically appropriate, within 7d of admission  Monitor diet advancement, weights, GI status, POC     Malnutrition Assessment:  Malnutrition Status:  Severe malnutrition (06/20/23 0857)    Context:  Chronic Illness     Findings of the 6 clinical characteristics of malnutrition:  Energy Intake:  75% or less estimated energy requirements for 1 month or longer  Weight Loss:  Greater than 7.5% over 3 months     Body Fat Loss:  No significant body fat loss     Muscle Mass Loss:  Mild muscle mass loss Temples (temporalis), Hand (interosseous)  Fluid Accumulation:  Unable to assess     Strength:  Not Performed    Nutrition Assessment:    Admitted w/ intractible N+V, hypokalemia, h/o DM2. Pt endorses eating very little for the past 2-3mo, sips of water and some sweet things like popsicles; noted significant loss of 39#/21.7% x3mo and 28.3% x1 yr per chart review, pt states she lost 60# x7mo. She reports she took prednisone for along time and gained wt, UBW of 200#, now off prednisone, and has had N/V since being started on metformin. NFPE performed, mild wasting noted, meets criteria for malnutrition. Pt declined oral supplements once on oral diet, states that milk products cause flares in her rheumatoid arthritis; also rarely eats meat, but pt prefers to choose foods based on preferences. Will follow at high nutrition risk.     Nutrition Related Findings:    +lactated ringers, protonix, humalog; K 2.8, hgb 10.8 Wound Type: None       Current Nutrition Intake & Therapies:    Average Meal Intake: NPO, Unable to assess  Average Supplements Intake: NPO, Unable to assess  Diet NPO Exceptions are: Ice Chips, Sips of Water with Meds, Sips of Clear Liquids    Anthropometric Measures:  Height: 5' 4\" (162.6 cm)  Ideal Body Weight
Outpatient Pharmacy Progress Note for Meds-to-Beds    Total number of Prescriptions Filled: 2  The following medications were dispensed to the patient during the discharge process:  Calcium acetate  Pantoprazole     Additional Documentation:  Patient picked-up the medication(s) in the OP Pharmacy  Did page prescriber regarding calcium acetate       Thank you for letting us serve your patients.   [facility]    [address], [address] [facility]    Phone: [phone]    Fax: [phone]
Physician Progress Note      Mela Cole  CSN #:                  [id number]  :                       1975  ADMIT DATE:       2023 5:29 PM  [address] DATE:        2023 11:55 AM  RESPONDING  PROVIDER #:        Dian Apley MD          QUERY TEXT:    Internal Medicine,    Pt admitted with intractable nausea and vomiting and noted gastritis   documented on EGD but gastritis has not been documented as the cause of the   nausea and vomiting. If possible, please document in progress notes and   discharge summary if you are evaluating and /or treating any of the following: The medical record reflects the following:  Risk Factors: nonspecific  Clinical Indicators: Path report shows rare possible H. Pylori, Per discharges   summary:  EGD on  showed Gastritis in fundus, body and antrum. Bx taken   to check for h pylori. Discharge patient in a stable condition to continue   PPI and follow-up with GI for biopsy results and further recommendations. Treatment: labs, imaging, GI consult, EGD, PPI    Jody Dong RN CDS  [phone]  Options provided:  -- Nausea and Vomiting due to gastritis  -- Nausea and Vomiting due to ##please specify, please document suspected   cause. -- Other - I will add my own diagnosis  -- Disagree - Not applicable / Not valid  -- Disagree - Clinically unable to determine / Unknown  -- Refer to Clinical Documentation Reviewer    PROVIDER RESPONSE TEXT:    This patient has nausea and vomiting due to gastritis.     Query created by: Jose Luis Bueno on 2023 9:18 AM      Electronically signed by:  Dian Apley MD 2023 10:59 AM
Physician Progress Note      Rui Napoles  [facility] #:                  [id number]  :                       1975  ADMIT DATE:       2023 5:29 PM  [address] DATE:        2023 11:55 AM  RESPONDING  PROVIDER #:        Katie Wheatley MD          QUERY TEXT:    Internal Medicine,    Pt admitted with Gastritis and has Severe malnutrition documented in Dietician   progress note on . Please further specify type of malnutrition with   documentation in the medical record. The medical record reflects the following:  Risk Factors: Weight loss Mild muscle mass loss poor intake nausea and   vomiting gastritis  Clinical Indicators: RD progress note  noted Severe malnutrition Weight   Loss: Greater than 7.5% over 3 months Mild muscle mass loss. Pt endorses   eating very little for the past 2-3mo  Treatment: Oral diet , I&O    Thank you,  Shakira Bone RN [facility]  [phone]    ASPEN Criteria:    https://aspenjournals. onlinelibrary. cabral. com/doi/full/10.1177/793586101185805  5  Options provided:  -- Severe Malnutrition  -- Other - I will add my own diagnosis  -- Disagree - Not applicable / Not valid  -- Disagree - Clinically unable to determine / Unknown  -- Refer to Clinical Documentation Reviewer    PROVIDER RESPONSE TEXT:    This patient has severe malnutrition.     Query created by: Maegan Matias on 2023 2:17 PM      Electronically signed by:  Katie Wheatley MD 2023 11:01 AM
Spoke to Pharmacy Radha Cardona) she stated that because of having GI issues the potassium pill can make those symptoms worse and that from a potassium standpoint she would wait to give more potassium replacement until the next lab draw. Also, don't give the 1245 dose because it can cause an increase in N/V. Will keep monitoring.
4. 5   HGB 11.6* 11.5* 10.8*    285 245     BMP:    Recent Labs     06/19/23  1022 06/19/23  1718 06/20/23  0125    135 138   K 2.8* 2.6* 2.9*   CL 96* 95* 100   CO2 26 25 26   BUN 10 10 8   CREATININE 0.8 0.8 0.7   GLUCOSE 110* 117* 93     Hepatic:   Recent Labs     06/19/23  1022 06/19/23  1718   AST 37 36   ALT 33 33   BILITOT 0.8 0.7   ALKPHOS 62 62     Lipids:   Lab Results   Component Value Date/Time    CHOL 215 10/22/2022 09:14 AM    CHOL 244 07/11/2022 04:04 PM    HDL 72 10/22/2022 09:14 AM    TRIG 232 10/22/2022 09:14 AM     Hemoglobin A1C:   Lab Results   Component Value Date/Time    LABA1C 6.5 05/20/2023 08:10 AM     TSH: No results found for: TSH  Troponin:   Lab Results   Component Value Date/Time    TROPONINT <0.010 06/20/2023 01:25 AM     Lactic Acid: No results for input(s): LACTA in the last 72 hours. BNP: No results for input(s): PROBNP in the last 72 hours.   UA:No results found for: Shelia Diesel, PHUR, LABCAST, WBCUA, RBCUA, MUCUS, TRICHOMONAS, YEAST, BACTERIA, CLARITYU, SPECGRAV, LEUKOCYTESUR, UROBILINOGEN, BILIRUBINUR, BLOODU, GLUCOSEU, KETUA, AMORPHOUS  Urine Cultures: No results found for: LABURIN  Blood Cultures: No results found for: BC  No results found for: BLOODCULT2  Organism: No results found for: [facility]      Electronically signed by Ranjeet Hill MD on 6/20/2023 at 8:16 AM

## 2023-06-26 ENCOUNTER — TELEPHONE (OUTPATIENT)
Dept: GASTROENTEROLOGY | Age: 48
End: 2023-06-26

## 2023-06-27 ENCOUNTER — TELEPHONE (OUTPATIENT)
Dept: GASTROENTEROLOGY | Age: 48
End: 2023-06-27

## 2023-06-28 ENCOUNTER — OFFICE VISIT (OUTPATIENT)
Dept: GASTROENTEROLOGY | Age: 48
End: 2023-06-28
Payer: COMMERCIAL

## 2023-06-28 VITALS
OXYGEN SATURATION: 98 % | HEIGHT: 64 IN | WEIGHT: 146 LBS | DIASTOLIC BLOOD PRESSURE: 66 MMHG | BODY MASS INDEX: 24.92 KG/M2 | SYSTOLIC BLOOD PRESSURE: 102 MMHG | HEART RATE: 86 BPM | RESPIRATION RATE: 17 BRPM

## 2023-06-28 DIAGNOSIS — R11.0 NAUSEA: ICD-10-CM

## 2023-06-28 DIAGNOSIS — Z12.11 ENCOUNTER FOR SCREENING COLONOSCOPY: ICD-10-CM

## 2023-06-28 DIAGNOSIS — A04.8 H. PYLORI INFECTION: Primary | ICD-10-CM

## 2023-06-28 DIAGNOSIS — R79.89 ELEVATED FERRITIN: ICD-10-CM

## 2023-06-28 DIAGNOSIS — D64.9 ANEMIA, UNSPECIFIED TYPE: ICD-10-CM

## 2023-06-28 PROCEDURE — 99214 OFFICE O/P EST MOD 30 MIN: CPT | Performed by: NURSE PRACTITIONER

## 2023-06-28 RX ORDER — PANTOPRAZOLE SODIUM 40 MG/1
40 TABLET, DELAYED RELEASE ORAL
Qty: 28 TABLET | Refills: 0 | Status: SHIPPED | OUTPATIENT
Start: 2023-06-28 | End: 2023-07-12

## 2023-06-28 RX ORDER — AMOXICILLIN 500 MG/1
1000 CAPSULE ORAL 2 TIMES DAILY
Qty: 56 CAPSULE | Refills: 0 | Status: SHIPPED | OUTPATIENT
Start: 2023-06-28 | End: 2023-07-12

## 2023-06-28 RX ORDER — CLARITHROMYCIN 500 MG/1
500 TABLET, COATED ORAL 2 TIMES DAILY
Qty: 28 TABLET | Refills: 0 | Status: SHIPPED | OUTPATIENT
Start: 2023-06-28 | End: 2023-07-12

## 2023-06-29 PROBLEM — A04.8 H. PYLORI INFECTION: Status: ACTIVE | Noted: 2023-06-20

## 2023-07-10 ENCOUNTER — OFFICE VISIT (OUTPATIENT)
Dept: RHEUMATOLOGY | Age: 48
End: 2023-07-10
Payer: COMMERCIAL

## 2023-07-10 VITALS
DIASTOLIC BLOOD PRESSURE: 70 MMHG | SYSTOLIC BLOOD PRESSURE: 110 MMHG | OXYGEN SATURATION: 98 % | WEIGHT: 144 LBS | HEART RATE: 88 BPM | BODY MASS INDEX: 24.72 KG/M2

## 2023-07-10 DIAGNOSIS — L40.50 PSORIATIC ARTHRITIS (HCC): Primary | ICD-10-CM

## 2023-07-10 DIAGNOSIS — Z79.899 HIGH RISK MEDICATION USE: ICD-10-CM

## 2023-07-10 PROCEDURE — 99214 OFFICE O/P EST MOD 30 MIN: CPT | Performed by: STUDENT IN AN ORGANIZED HEALTH CARE EDUCATION/TRAINING PROGRAM

## 2023-08-28 ENCOUNTER — OFFICE VISIT (OUTPATIENT)
Dept: GASTROENTEROLOGY | Age: 48
End: 2023-08-28
Payer: COMMERCIAL

## 2023-08-28 ENCOUNTER — HOSPITAL ENCOUNTER (OUTPATIENT)
Age: 48
Discharge: HOME | End: 2023-08-28
Payer: COMMERCIAL

## 2023-08-28 VITALS
DIASTOLIC BLOOD PRESSURE: 82 MMHG | TEMPERATURE: 97.2 F | OXYGEN SATURATION: 98 % | BODY MASS INDEX: 25.06 KG/M2 | HEART RATE: 81 BPM | WEIGHT: 146.8 LBS | SYSTOLIC BLOOD PRESSURE: 118 MMHG | HEIGHT: 64 IN

## 2023-08-28 DIAGNOSIS — R79.89 ELEVATED FERRITIN: ICD-10-CM

## 2023-08-28 DIAGNOSIS — Z86.19 HISTORY OF HELICOBACTER PYLORI INFECTION: Primary | ICD-10-CM

## 2023-08-28 DIAGNOSIS — Z86.19 HISTORY OF HELICOBACTER PYLORI INFECTION: ICD-10-CM

## 2023-08-28 DIAGNOSIS — D64.9 ANEMIA, UNSPECIFIED TYPE: ICD-10-CM

## 2023-08-28 LAB
BASOPHILS ABSOLUTE: 0 K/CU MM
BASOPHILS RELATIVE PERCENT: 0.7 % (ref 0–1)
DIFFERENTIAL TYPE: ABNORMAL
EOSINOPHILS ABSOLUTE: 0.2 K/CU MM
EOSINOPHILS RELATIVE PERCENT: 2.6 % (ref 0–3)
FERRITIN: 87 NG/ML (ref 15–150)
HCT VFR BLD CALC: 37.6 % (ref 37–47)
HEMOGLOBIN: 11.4 GM/DL (ref 12.5–16)
IMMATURE NEUTROPHIL %: 0.2 % (ref 0–0.43)
LYMPHOCYTES ABSOLUTE: 2.1 K/CU MM
LYMPHOCYTES RELATIVE PERCENT: 36.9 % (ref 24–44)
MCH RBC QN AUTO: 28.6 PG (ref 27–31)
MCHC RBC AUTO-ENTMCNC: 30.3 % (ref 32–36)
MCV RBC AUTO: 94.2 FL (ref 78–100)
MONOCYTES ABSOLUTE: 0.3 K/CU MM
MONOCYTES RELATIVE PERCENT: 5.4 % (ref 0–4)
NUCLEATED RBC %: 0 %
PDW BLD-RTO: 13.2 % (ref 11.7–14.9)
PLATELET # BLD: 286 K/CU MM (ref 140–440)
PMV BLD AUTO: 10.1 FL (ref 7.5–11.1)
RBC # BLD: 3.99 M/CU MM (ref 4.2–5.4)
SEGMENTED NEUTROPHILS ABSOLUTE COUNT: 3.1 K/CU MM
SEGMENTED NEUTROPHILS RELATIVE PERCENT: 54.2 % (ref 36–66)
TOTAL IMMATURE NEUTOROPHIL: 0.01 K/CU MM
TOTAL NUCLEATED RBC: 0 K/CU MM
WBC # BLD: 5.7 K/CU MM (ref 4–10.5)

## 2023-08-28 PROCEDURE — 85025 COMPLETE CBC W/AUTO DIFF WBC: CPT

## 2023-08-28 PROCEDURE — 36415 COLL VENOUS BLD VENIPUNCTURE: CPT

## 2023-08-28 PROCEDURE — 81256 HFE GENE: CPT

## 2023-08-28 PROCEDURE — 82728 ASSAY OF FERRITIN: CPT

## 2023-08-28 PROCEDURE — 83013 H PYLORI (C-13) BREATH: CPT

## 2023-08-28 PROCEDURE — 99213 OFFICE O/P EST LOW 20 MIN: CPT | Performed by: NURSE PRACTITIONER

## 2023-08-28 NOTE — PROGRESS NOTES
MG/DL Final    Potassium 06/20/2023 3.5  3.5 - 5.1 MMOL/L Final    POC Glucose 06/20/2023 89  70 - 99 MG/DL Final    POC Glucose 06/20/2023 73  70 - 99 MG/DL Final    POC Glucose 06/20/2023 69 (L)  70 - 99 MG/DL Final    Magnesium 06/20/2023 1.8  1.8 - 2.4 mg/dl Final    POC Glucose 06/20/2023 87  70 - 99 MG/DL Final    WBC 06/21/2023 4.7  4.0 - 10.5 K/CU MM Final    RBC 06/21/2023 4.07 (L)  4.2 - 5.4 M/CU MM Final    Hemoglobin 06/21/2023 10.8 (L)  12.5 - 16.0 GM/DL Final    Hematocrit 06/21/2023 33.0 (L)  37 - 47 % Final    MCV 06/21/2023 81.1  78 - 100 FL Final    MCH 06/21/2023 26.5 (L)  27 - 31 PG Final    MCHC 06/21/2023 32.7  32.0 - 36.0 % Final    RDW 06/21/2023 14.9  11.7 - 14.9 % Final    Platelets 85/82/6091 250  140 - 440 K/CU MM Final    MPV 06/21/2023 10.2  7.5 - 11.1 FL Final    Differential Type 06/21/2023 AUTOMATED DIFFERENTIAL   Final    Segs Relative 06/21/2023 52.4  36 - 66 % Final    Lymphocytes % 06/21/2023 36.3  24 - 44 % Final    Monocytes % 06/21/2023 8.0 (H)  0 - 4 % Final    Eosinophils % 06/21/2023 2.3  0 - 3 % Final    Basophils % 06/21/2023 0.8  0 - 1 % Final    Segs Absolute 06/21/2023 2.5  K/CU MM Final    Lymphocytes Absolute 06/21/2023 1.7  K/CU MM Final    Monocytes Absolute 06/21/2023 0.4  K/CU MM Final    Eosinophils Absolute 06/21/2023 0.1  K/CU MM Final    Basophils Absolute 06/21/2023 0.0  K/CU MM Final    Nucleated RBC % 06/21/2023 0.0  % Final    Total Nucleated RBC 06/21/2023 0.0  K/CU MM Final    Total Immature Neutrophil 06/21/2023 0.01  K/CU MM Final    Immature Neutrophil % 06/21/2023 0.2  0 - 0.43 % Final    Sodium 06/21/2023 138  135 - 145 MMOL/L Final    Potassium 06/21/2023 3.5  3.5 - 5.1 MMOL/L Final    Chloride 06/21/2023 102  99 - 110 mMol/L Final    CO2 06/21/2023 20 (L)  21 - 32 MMOL/L Final    BUN 06/21/2023 5 (L)  6 - 23 MG/DL Final    Creatinine 06/21/2023 0.7  0.6 - 1.1 MG/DL Final    Est, Glom Filt Rate 06/21/2023 >60  >60 mL/min/1.73m2 Final    Comment:

## 2023-08-29 LAB — H PYLORI BREATH TEST: NEGATIVE

## 2023-09-02 LAB
HFE GENE MUT ANL BLD/T: NORMAL
HFE P.C282Y BLD/T QL: NEGATIVE
HFE P.H63D BLD/T QL: NEGATIVE
HFE P.S65C BLD/T QL: NEGATIVE
SPECIMEN SOURCE: NORMAL

## 2023-10-06 NOTE — PROGRESS NOTES
inflammation  Neck: Trachea midline, no masses  Lymph: no LAD  Lungs: CTAB, no rales  Heart: regular rate and rhythm, S1, S2 normal, no murmur, no lower extremity edema  Abdomen: Soft, ND, NT. + BS. Extremities: atraumatic, no cyanosis or edema. Neurologic: CN 2-12 grossly intact. Skin: Scalp Psoriasis +,  scattered on bilateral forearm, warm and dry, no telangiectasias, no digital pitting, no sclerodactyly, no rheumatoid nodules, no livedo  MSK: 5/5 strength of the proximal and distal muscles of the upper and lower extremities. WRIST: no synovitis, good ROM  HANDS: left 4th and 5th PIP synovitis+,  Elbow: No synovitis, good ROM,   Shoulder:good ROM,   Knee: no effusion, good ROM,   Ankle:left synovitis+ good ROM,   FEET: neg forefoot squeeze test    Spine:  Normal range of motion; no tender points, no obvious deformities. Neuro:  Alert & oriented x 3, normal motor function, normal sensory function, no focal deficits noted . Muscle strength: 4/4 in bilateral upper and lower extremities. Psychiatric: Mood and affect are appropriate, recent and remote memory normal,    LABS AND IMAGING  I have reviewed her result performed during our previous encounter   Xray of the wrists, hands and feet are unremarkable, there are no soft tissue changes, periosteal changes, joint space narrowing or erosions   Labs showed mild anemia  Elevated CRP and ESR   Hepatitis Panel neg   Quant neg   Rf pos  CCP neg   DEXA scan normal     2/4/2023  Hemoglobin A1c uncontrolled-10.0, H  Potassium 3.4, L  WBC normal  Hemoglobin 10.2  Platelets normal    50/22/6181  LFTs normal    DEXA scan 10/31/2023 is normal    Left 4th PIP injection  TIME OUT PROCEDURE  Verified correct patient, correct procedure, correct site, correct side, site marked, correct patient position, correct supplies/equipment, consent verified, after discussing risks and benefit of procedure. Procedure not being performed in OR or with ignition source.     After

## 2023-10-09 ENCOUNTER — OFFICE VISIT (OUTPATIENT)
Dept: RHEUMATOLOGY | Age: 48
End: 2023-10-09
Payer: COMMERCIAL

## 2023-10-09 VITALS
DIASTOLIC BLOOD PRESSURE: 65 MMHG | OXYGEN SATURATION: 100 % | HEART RATE: 75 BPM | WEIGHT: 155 LBS | BODY MASS INDEX: 26.61 KG/M2 | SYSTOLIC BLOOD PRESSURE: 110 MMHG

## 2023-10-09 DIAGNOSIS — L40.9 PSORIASIS: ICD-10-CM

## 2023-10-09 DIAGNOSIS — Z79.899 HIGH RISK MEDICATION USE: ICD-10-CM

## 2023-10-09 DIAGNOSIS — L40.50 PSORIATIC ARTHRITIS (HCC): Primary | ICD-10-CM

## 2023-10-09 PROCEDURE — 99215 OFFICE O/P EST HI 40 MIN: CPT | Performed by: STUDENT IN AN ORGANIZED HEALTH CARE EDUCATION/TRAINING PROGRAM

## 2023-10-09 PROCEDURE — 20600 DRAIN/INJ JOINT/BURSA W/O US: CPT | Performed by: STUDENT IN AN ORGANIZED HEALTH CARE EDUCATION/TRAINING PROGRAM

## 2023-10-09 RX ORDER — PREDNISONE 5 MG/1
5 TABLET ORAL DAILY
Qty: 30 TABLET | Refills: 2 | Status: SHIPPED | OUTPATIENT
Start: 2023-10-09

## 2023-10-09 RX ORDER — CLOBETASOL PROPIONATE 0.5 MG/G
OINTMENT TOPICAL
Qty: 60 G | Refills: 1 | Status: SHIPPED | OUTPATIENT
Start: 2023-10-09

## 2023-10-09 RX ORDER — TRIAMCINOLONE ACETONIDE 40 MG/ML
20 INJECTION, SUSPENSION INTRA-ARTICULAR; INTRAMUSCULAR ONCE
Status: COMPLETED | OUTPATIENT
Start: 2023-10-09 | End: 2023-10-09

## 2023-10-09 RX ADMIN — TRIAMCINOLONE ACETONIDE 20 MG: 40 INJECTION, SUSPENSION INTRA-ARTICULAR; INTRAMUSCULAR at 08:56

## 2023-10-09 NOTE — PATIENT INSTRUCTIONS
Continue apremilast 30mg twice daily  Take prednisone 1-3 tabs daily as needed for flares   Apply clobetasol ointment twice daily on psoriatic scalp  RTC in 3 months

## 2023-11-27 ENCOUNTER — OFFICE VISIT (OUTPATIENT)
Dept: FAMILY MEDICINE CLINIC | Age: 48
End: 2023-11-27
Payer: COMMERCIAL

## 2023-11-27 VITALS
DIASTOLIC BLOOD PRESSURE: 78 MMHG | WEIGHT: 157.8 LBS | SYSTOLIC BLOOD PRESSURE: 122 MMHG | OXYGEN SATURATION: 100 % | HEART RATE: 92 BPM | BODY MASS INDEX: 27.09 KG/M2 | TEMPERATURE: 97.2 F

## 2023-11-27 DIAGNOSIS — E09.9 STEROID-INDUCED DIABETES MELLITUS, SUBSEQUENT ENCOUNTER (HCC): Primary | ICD-10-CM

## 2023-11-27 DIAGNOSIS — N95.1 MENOPAUSE SYNDROME: ICD-10-CM

## 2023-11-27 DIAGNOSIS — D64.9 ANEMIA, UNSPECIFIED TYPE: ICD-10-CM

## 2023-11-27 DIAGNOSIS — T38.0X5D STEROID-INDUCED DIABETES MELLITUS, SUBSEQUENT ENCOUNTER (HCC): Primary | ICD-10-CM

## 2023-11-27 DIAGNOSIS — E87.6 HYPOKALEMIA: ICD-10-CM

## 2023-11-27 DIAGNOSIS — M19.90 CHRONIC INFLAMMATORY ARTHRITIS: ICD-10-CM

## 2023-11-27 PROCEDURE — 99214 OFFICE O/P EST MOD 30 MIN: CPT | Performed by: FAMILY MEDICINE

## 2023-11-27 PROCEDURE — 36415 COLL VENOUS BLD VENIPUNCTURE: CPT | Performed by: FAMILY MEDICINE

## 2023-11-27 RX ORDER — ESTRADIOL 0.05 MG/D
1 PATCH, EXTENDED RELEASE TRANSDERMAL
Qty: 8 PATCH | Refills: 3 | Status: SHIPPED | OUTPATIENT
Start: 2023-11-27

## 2023-11-27 ASSESSMENT — ENCOUNTER SYMPTOMS
CONSTIPATION: 0
DIARRHEA: 0

## 2023-11-27 NOTE — ASSESSMENT & PLAN NOTE
To solve the problem of extended release metformin apparently going through her body I will change to immediate release 1000 mg once daily. I will check a lipid panel and an A1c. If the A1c is 6.5 or above today then I think we can change the diagnosis to diabetes mellitus type 2. Even if the A1c is great today she will want to continue taking the metformin either as a diabetes preventative measure or in case she does need to use prednisone during the cold months this year.

## 2023-11-28 LAB
ANION GAP SERPL CALCULATED.3IONS-SCNC: 11 MMOL/L (ref 3–16)
BASOPHILS # BLD: 0 K/UL (ref 0–0.2)
BASOPHILS NFR BLD: 0.4 %
BUN SERPL-MCNC: 20 MG/DL (ref 7–20)
CALCIUM SERPL-MCNC: 9.2 MG/DL (ref 8.3–10.6)
CHLORIDE SERPL-SCNC: 105 MMOL/L (ref 99–110)
CHOLEST SERPL-MCNC: 204 MG/DL (ref 0–199)
CO2 SERPL-SCNC: 26 MMOL/L (ref 21–32)
CREAT SERPL-MCNC: 0.6 MG/DL (ref 0.6–1.1)
DEPRECATED RDW RBC AUTO: 13.5 % (ref 12.4–15.4)
EOSINOPHIL # BLD: 0.1 K/UL (ref 0–0.6)
EOSINOPHIL NFR BLD: 1.7 %
EST. AVERAGE GLUCOSE BLD GHB EST-MCNC: 116.9 MG/DL
GFR SERPLBLD CREATININE-BSD FMLA CKD-EPI: >60 ML/MIN/{1.73_M2}
GLUCOSE SERPL-MCNC: 61 MG/DL (ref 70–99)
HBA1C MFR BLD: 5.7 %
HCT VFR BLD AUTO: 32.9 % (ref 36–48)
HDLC SERPL-MCNC: 66 MG/DL (ref 40–60)
HGB BLD-MCNC: 11.2 G/DL (ref 12–16)
LDLC SERPL CALC-MCNC: 115 MG/DL
LYMPHOCYTES # BLD: 2.1 K/UL (ref 1–5.1)
LYMPHOCYTES NFR BLD: 31.6 %
MCH RBC QN AUTO: 28.7 PG (ref 26–34)
MCHC RBC AUTO-ENTMCNC: 34 G/DL (ref 31–36)
MCV RBC AUTO: 84.5 FL (ref 80–100)
MONOCYTES # BLD: 0.4 K/UL (ref 0–1.3)
MONOCYTES NFR BLD: 6.9 %
NEUTROPHILS # BLD: 3.8 K/UL (ref 1.7–7.7)
NEUTROPHILS NFR BLD: 59.4 %
PLATELET # BLD AUTO: 326 K/UL (ref 135–450)
PMV BLD AUTO: 8.4 FL (ref 5–10.5)
POTASSIUM SERPL-SCNC: 4 MMOL/L (ref 3.5–5.1)
RBC # BLD AUTO: 3.9 M/UL (ref 4–5.2)
SODIUM SERPL-SCNC: 142 MMOL/L (ref 136–145)
TRIGL SERPL-MCNC: 114 MG/DL (ref 0–150)
VLDLC SERPL CALC-MCNC: 23 MG/DL
WBC # BLD AUTO: 6.5 K/UL (ref 4–11)

## 2023-12-12 ENCOUNTER — TELEPHONE (OUTPATIENT)
Dept: RHEUMATOLOGY | Age: 48
End: 2023-12-12

## 2023-12-12 NOTE — TELEPHONE ENCOUNTER
LVM advising the pt that the [facility] paper work dropped off has been completed and may be picked up in office.

## 2024-01-13 NOTE — PROGRESS NOTES
RHEUMATOLOGY FOLLOW UP VISIT    1/15/2024      Patient Name: Mark Morrison  : 1975  Medical Record: [id number]      CHIEF COMPLAINT  PsA- Nail changes, hx of PSO, asymmetrical arthritis   RF+     Pertinent Problems  Gastritis  Diabetes Mellitus   Scalp psoriasis    HISTORY OF PRESENT ILLNESS    aMrk Morrison is a 48 y.o. female established since 10/17/2022  Problem began in 2020 which began with left shoulder pain that worsened to neck, shoulder pain then bilateral hand swelling and puffiness. She establised care with outside rheumatologist who treated with MTX 17.5mg that caused hair loss and symptom did not improve. Took Humira for almost 6 weeks that caused abdominal hives. Started Xeljanz which helped her joints significantly, she took for 3 months that caused elevated liver enzymes then it was held. SSZ did not help her joints   She has been getting prednisone refills from PCP since 2021. Hx of HCQ use in 2022 took for 5 days and broke out in hives. 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  Currently her insurance does not cover Biologics.  She failed leflunomide    LCV 10/9/23  Biologics are not approved by her insurance. Efforts to enroll her in [employer] and xeljanz discount program failed  Apremilast was started on 2023  Labs showed mild anemia  Elevated CRP and ESR   She was in the hospital on 2023 for intractable nausea and vomiting. EGD ws negative     Subjective:  She is reporting left hip pain, left swelling in 2nd toe and left finger that started since the cold weather began.  she was doing better when the temperature was warmer  Pain is 6-8/10  She uses prednisone 5-10 mg for flare days   She  is obtaining apremilast from [facility].  Blood sugar is stable   Pso is mostly controlled though she has refractory lesions on her scalp that is not responding to shampoo prescribed by

## 2024-01-15 ENCOUNTER — OFFICE VISIT (OUTPATIENT)
Dept: RHEUMATOLOGY | Age: 49
End: 2024-01-15
Payer: COMMERCIAL

## 2024-01-15 VITALS
OXYGEN SATURATION: 99 % | DIASTOLIC BLOOD PRESSURE: 72 MMHG | SYSTOLIC BLOOD PRESSURE: 118 MMHG | HEIGHT: 64 IN | WEIGHT: 156 LBS | BODY MASS INDEX: 26.63 KG/M2 | HEART RATE: 71 BPM

## 2024-01-15 DIAGNOSIS — L40.50 PSORIATIC ARTHRITIS (HCC): Primary | ICD-10-CM

## 2024-01-15 DIAGNOSIS — Z79.899 HIGH RISK MEDICATION USE: ICD-10-CM

## 2024-01-15 DIAGNOSIS — L40.9 PSORIASIS: ICD-10-CM

## 2024-01-15 PROCEDURE — 99214 OFFICE O/P EST MOD 30 MIN: CPT | Performed by: STUDENT IN AN ORGANIZED HEALTH CARE EDUCATION/TRAINING PROGRAM

## 2024-01-15 RX ORDER — CLOBETASOL PROPIONATE 0.5 MG/G
OINTMENT TOPICAL
Qty: 60 G | Refills: 1 | Status: SHIPPED | OUTPATIENT
Start: 2024-01-15

## 2024-01-16 ENCOUNTER — TELEPHONE (OUTPATIENT)
Dept: RHEUMATOLOGY | Age: 49
End: 2024-01-16

## 2024-01-16 NOTE — TELEPHONE ENCOUNTER
Reached out tot he pt to let her know that she will need to complete the [employer] application for the 2024 year. This application is for the medication Otezla. Pt verbalized understanding and states she will be picking it up next week.

## 2024-02-14 ENCOUNTER — TELEPHONE (OUTPATIENT)
Dept: RHEUMATOLOGY | Age: 49
End: 2024-02-14

## 2024-02-26 ENCOUNTER — OFFICE VISIT (OUTPATIENT)
Dept: FAMILY MEDICINE CLINIC | Age: 49
End: 2024-02-26
Payer: COMMERCIAL

## 2024-02-26 VITALS
TEMPERATURE: 97.3 F | DIASTOLIC BLOOD PRESSURE: 60 MMHG | SYSTOLIC BLOOD PRESSURE: 100 MMHG | OXYGEN SATURATION: 98 % | BODY MASS INDEX: 25.95 KG/M2 | HEART RATE: 95 BPM | WEIGHT: 151.2 LBS

## 2024-02-26 DIAGNOSIS — H92.01 RIGHT EAR PAIN: ICD-10-CM

## 2024-02-26 DIAGNOSIS — M19.90 CHRONIC INFLAMMATORY ARTHRITIS: ICD-10-CM

## 2024-02-26 DIAGNOSIS — Z00.00 ENCOUNTER FOR WELL ADULT EXAM WITHOUT ABNORMAL FINDINGS: Primary | ICD-10-CM

## 2024-02-26 PROCEDURE — 99396 PREV VISIT EST AGE 40-64: CPT | Performed by: FAMILY MEDICINE

## 2024-02-26 RX ORDER — IBUPROFEN 800 MG/1
800 TABLET ORAL 2 TIMES DAILY PRN
Qty: 180 TABLET | Refills: 2 | Status: SHIPPED | OUTPATIENT
Start: 2024-02-26

## 2024-02-26 ASSESSMENT — ENCOUNTER SYMPTOMS
SORE THROAT: 0
COUGH: 0
RHINORRHEA: 0
VOMITING: 0
NAUSEA: 0
TROUBLE SWALLOWING: 0
CONSTIPATION: 0
APNEA: 0
ABDOMINAL PAIN: 0
DIARRHEA: 0
SHORTNESS OF BREATH: 0
EYE PAIN: 0
BACK PAIN: 0

## 2024-02-26 ASSESSMENT — PATIENT HEALTH QUESTIONNAIRE - PHQ9
SUM OF ALL RESPONSES TO PHQ9 QUESTIONS 1 & 2: 0
SUM OF ALL RESPONSES TO PHQ QUESTIONS 1-9: 0
2. FEELING DOWN, DEPRESSED OR HOPELESS: 0
SUM OF ALL RESPONSES TO PHQ QUESTIONS 1-9: 0
1. LITTLE INTEREST OR PLEASURE IN DOING THINGS: 0

## 2024-02-26 NOTE — PATIENT INSTRUCTIONS
Advance Care Planning     Advance Care Planning opens a door to talk about and write down your wishes before a sudden accident or illness.  Make your goals, values, and preferences known.     This puts you in the ’s seat and helps others know what matters most to you so they won’t have to guess.      Where can you learn more?    Go to [email or web address]-resources/[email or web address]-care-planning   to learn how to:    Name someone you trust to make healthcare decisions for you, only if you can’t. (Healthcare Power of )    Document your wishes for care if you were seriously ill and not expected to recover or are approaching end of life. (Advance Directive or Living Will)    The same page can be found using the QR code below.                Well Visit, Ages 18 to 65: Care Instructions  Well visits can help you stay healthy. Your doctor has checked your overall health and may have suggested ways to take good care of yourself. Your doctor also may have recommended tests. You can help prevent illness with healthy eating, good sleep, vaccinations, regular exercise, and other steps.    Get the tests that you and your doctor decide on. Depending on your age and risks, examples might include screening for diabetes; hepatitis C; HIV; and cervical, breast, lung, and colon cancer. Screening helps find diseases before any symptoms appear.   Eat healthy foods. Choose fruits, vegetables, whole grains, lean protein, and low-fat dairy foods. Limit saturated fat and reduce salt.     Limit alcohol. Men should have no more than 2 drinks a day. Women should have no more than 1. For some people, no alcohol is the best choice.   Exercise. Get at least 30 minutes of exercise on most days of the week. Walking can be a good choice.     Reach and stay at your healthy weight. This will lower your risk for many health problems.   Take care of your mental health. Try to stay connected with friends, family, and community, and find

## 2024-02-26 NOTE — ASSESSMENT & PLAN NOTE
She is working with a rheumatologist and symptoms fluctuate from day-to-day.  She had visible slow this getting up from chair to exam table today.  I will send prescription for 800 mg ibuprofen tablets since that should probably safer somebody compared to by the dog prescription ibuprofen.

## 2024-02-26 NOTE — PROGRESS NOTES
2 times daily as needed for Pain 180 tablet 2    Apremilast 30 MG TABS Take 30 mg by mouth in the morning and at bedtime 180 tablet 0    clobetasol (TEMOVATE) 0.05 % ointment Apply topically 2 times daily. 60 g 1    metFORMIN (GLUCOPHAGE) 1000 MG tablet Take 1 tablet by mouth daily (with breakfast) 90 tablet 1    estradiol (VIVELLE) 0.05 MG/24HR Place 1 patch onto the skin Twice a Week 8 patch 3    Lancets MISC 1 each by Does not apply route daily 100 each 5    blood glucose monitor strips 1 strip by Other route daily as needed for Other (monitoring blood sugar) 100 strip 3    predniSONE (DELTASONE) 5 MG tablet Take 1 tablet by mouth daily (Patient not taking: Reported on 11/27/2023) 30 tablet 2    calcium acetate (PHOSLO) 667 MG CAPS capsule Take 1 capsule by mouth 3 times daily (with meals) (Patient not taking: Reported on 8/28/2023) 90 capsule 0    pantoprazole (PROTONIX) 40 MG tablet Take 1 tablet by mouth every morning (before breakfast) (Patient not taking: Reported on 10/9/2023) 30 tablet 3    ondansetron (ZOFRAN) 4 MG tablet Take 1 tablet by mouth daily as needed for Nausea or Vomiting (Patient not taking: Reported on 8/28/2023) 30 tablet 2     No current facility-administered medications for this visit.       OBJECTIVE    /60 (Site: Left Upper Arm, Position: Sitting, Cuff Size: Medium Adult)   Pulse 95   Temp 97.3 °F (36.3 °C) (Infrared)   Wt 68.6 kg (151 lb 3.2 oz)   SpO2 98%   BMI 25.95 kg/m²     Physical Exam   Constitutional:       General: Not in acute distress.     Appearance: Normal appearance. Not ill-appearing, toxic-appearing or diaphoretic.   HENT:      Head: Normocephalic.      Right Ear: Tympanic membrane, ear canal and external ear normal.      Left Ear: Tympanic membrane, ear canal and external ear normal.      Nose: No rhinorrhea.      Mouth/Throat:      Mouth: Mucous membranes are moist.      Pharynx: Oropharynx is clear. No oropharyngeal exudate or posterior oropharyngeal

## 2024-04-20 NOTE — PROGRESS NOTES
RHEUMATOLOGY FOLLOW UP VISIT    2024      Patient Name: Mark Morrison  : 1975  Medical Record: [id number]      CHIEF COMPLAINT  PsA- Nail changes, hx of PSO, asymmetrical arthritis   RF+     Pertinent Problems  Gastritis  Diabetes Mellitus   Scalp psoriasis    HISTORY OF PRESENT ILLNESS    Mark Morrison is a 48 y.o. female established since 10/17/2022  Problem began in 2020 which began with left shoulder pain that worsened to neck, shoulder pain then bilateral hand swelling and puffiness. She establised care with outside rheumatologist who treated with MTX 17.5mg that caused hair loss and symptom did not improve. Took Humira for almost 6 weeks that caused abdominal hives. Started Xeljanz which helped her joints significantly, she took for 3 months that caused elevated liver enzymes then it was held. SSZ did not help her joints   She has been getting prednisone refills from PCP since 2021. Hx of HCQ use in 2022 took for 5 days and broke out in hives. 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  Currently her insurance does not cover Biologics.  She failed leflunomide    LCV 1/15/2024  Biologics are not approved by her insurance. Efforts to enroll her in [employer] and xeljanz discount program failed  Apremilast was started on 2023  Elevated CRP and ESR     Subjective:  She stopped apremilast because her company changed their insurance and she will need to re enroll.   Today she is reporting bilateral wrist pain and finger swelling.   Old weather makes her pain worse  she was doing better when the temperature was warmer  Pain is 3-4 /10  She uses Ibuprofen 800mg for flare days and has not needed prednisone lately  She was obtaining apremilast from [employer].  Pso is  intermittent, she has refractory lesions on her scalp.    Current rheum meds:   Clobetasol ointment    Past rheum meds: Ibuprofen, HCQ, MTX,

## 2024-04-22 ENCOUNTER — OFFICE VISIT (OUTPATIENT)
Dept: RHEUMATOLOGY | Age: 49
End: 2024-04-22
Payer: COMMERCIAL

## 2024-04-22 ENCOUNTER — TELEPHONE (OUTPATIENT)
Dept: RHEUMATOLOGY | Age: 49
End: 2024-04-22

## 2024-04-22 VITALS
SYSTOLIC BLOOD PRESSURE: 128 MMHG | BODY MASS INDEX: 27.03 KG/M2 | DIASTOLIC BLOOD PRESSURE: 76 MMHG | WEIGHT: 157.5 LBS | OXYGEN SATURATION: 99 % | RESPIRATION RATE: 68 BRPM

## 2024-04-22 DIAGNOSIS — Z79.899 HIGH RISK MEDICATION USE: ICD-10-CM

## 2024-04-22 DIAGNOSIS — L40.9 PSORIASIS: ICD-10-CM

## 2024-04-22 DIAGNOSIS — L40.50 PSORIATIC ARTHRITIS (HCC): Primary | ICD-10-CM

## 2024-04-22 DIAGNOSIS — Z79.52 CURRENT CHRONIC USE OF SYSTEMIC STEROIDS: ICD-10-CM

## 2024-04-22 PROCEDURE — 99214 OFFICE O/P EST MOD 30 MIN: CPT | Performed by: STUDENT IN AN ORGANIZED HEALTH CARE EDUCATION/TRAINING PROGRAM

## 2024-04-22 RX ORDER — CLOBETASOL PROPIONATE 0.5 MG/G
OINTMENT TOPICAL
Qty: 60 G | Refills: 2 | Status: SHIPPED | OUTPATIENT
Start: 2024-04-22

## 2024-04-22 NOTE — PATIENT INSTRUCTIONS
I have reordered apremilast 30mg twice daily  Get labs done  Take prednisone 1-3 tabs daily as needed for flares   Apply clobetasol ointment twice daily on psoriatic scalp  RTC in 3 months        Lab Locations:    [facility]  [address] [place] [address]    To Schedule DEXA Bone Scan, CT or MRI  Call at Central [phone]

## 2024-04-22 NOTE — TELEPHONE ENCOUNTER
Spoke with the pt and advised her that we have an application here in the clinic for her to fill out to help with Otezla. Pt verbalized understanding and states she will be by to get it.

## 2024-05-04 ENCOUNTER — HOSPITAL ENCOUNTER (OUTPATIENT)
Age: 49
Discharge: HOME | End: 2024-05-04
Payer: COMMERCIAL

## 2024-05-04 LAB
ALBUMIN SERPL-MCNC: 3.9 GM/DL (ref 3.4–5)
ALP BLD-CCNC: 71 IU/L (ref 40–129)
ALT SERPL-CCNC: 11 U/L (ref 10–40)
ANION GAP SERPL CALCULATED.3IONS-SCNC: 11 MMOL/L (ref 7–16)
AST SERPL-CCNC: 13 IU/L (ref 15–37)
BILIRUB SERPL-MCNC: 0.2 MG/DL (ref 0–1)
BUN SERPL-MCNC: 17 MG/DL (ref 6–23)
CALCIUM SERPL-MCNC: 8.4 MG/DL (ref 8.3–10.6)
CHLORIDE BLD-SCNC: 108 MMOL/L (ref 99–110)
CO2: 22 MMOL/L (ref 21–32)
CREAT SERPL-MCNC: 0.6 MG/DL (ref 0.6–1.1)
GFR, ESTIMATED: >90 ML/MIN/1.73M2
GLUCOSE SERPL-MCNC: 97 MG/DL (ref 70–99)
POTASSIUM SERPL-SCNC: 4.4 MMOL/L (ref 3.5–5.1)
SODIUM BLD-SCNC: 141 MMOL/L (ref 135–145)
TOTAL PROTEIN: 7 GM/DL (ref 6.4–8.2)

## 2024-05-04 PROCEDURE — 80053 COMPREHEN METABOLIC PANEL: CPT

## 2024-05-04 PROCEDURE — 36415 COLL VENOUS BLD VENIPUNCTURE: CPT

## 2024-05-14 NOTE — ANESTHESIA POSTPROCEDURE EVALUATION
Department of Anesthesiology  Postprocedure Note    Patient: Jensen Gee  MRN: [id number]  YOB: 1975  Date of evaluation: 6/20/2023      Procedure Summary     Date: 06/20/23 Room / Location: [address] [place] [address]    Anesthesia Start: 1257 Anesthesia Stop: 1742    Procedure: EGD BIOPSY Diagnosis:       Nausea and vomiting, unspecified vomiting type      Weight loss      (Nausea and vomiting, unspecified vomiting type [R11.2])      (Weight loss [R63.4])    Surgeons: Kristin Ramos MD Responsible Provider: Jonel Irwin MD    Anesthesia Type: MAC ASA Status: 2          Anesthesia Type: No value filed.     Hector Phase I:      Hector Phase II:        Anesthesia Post Evaluation    Patient location during evaluation: bedside  Patient participation: complete - patient participated  Level of consciousness: awake and alert  Pain score: 0  Airway patency: patent  Nausea & Vomiting: no nausea and no vomiting  Complications: no  Cardiovascular status: blood pressure returned to baseline  Respiratory status: acceptable  Hydration status: euvolemic No

## 2024-09-04 NOTE — PROGRESS NOTES
Abused: No     Sexually Abused: No         FAMILY HISTORY     Family History   Problem Relation Age of Onset    No Known Problems Mother     No Known Problems Father     No Known Problems Sister     No Known Problems Sister     No Known Problems Sister     No Known Problems Sister          PHYSICAL EXAM     Wt Readings from Last 3 Encounters:   04/22/24 71.4 kg (157 lb 8 oz)   02/26/24 68.6 kg (151 lb 3.2 oz)   01/15/24 70.8 kg (156 lb)     Temp Readings from Last 3 Encounters:   02/26/24 97.3 °F (36.3 °C) (Infrared)   11/27/23 97.2 °F (36.2 °C) (Infrared)   08/28/23 97.2 °F (36.2 °C) (Infrared)     BP Readings from Last 3 Encounters:   04/22/24 128/76   02/26/24 100/60   01/15/24 118/72     Pulse Readings from Last 3 Encounters:   02/26/24 95   01/15/24 71   11/27/23 92       General appearance:  Alert and oriented, NAD, well developed   HEENT: EOMI, no scleral injection, moist mucous membranes, no oral ulcers, normal hearing, no cartilage inflammation  Neck: Trachea midline, no masses  Lymph: no LAD  Lungs: CTAB, no rales  Heart: regular rate and rhythm, S1, S2 normal, no murmur, no lower extremity edema  Abdomen: Soft, ND, NT. + BS.  Extremities: atraumatic, no cyanosis or edema.   Neurologic: CN 2-12 grossly intact.   Skin: Scalp Psoriasis +,  scattered on bilateral forearm, warm and dry, no telangiectasias, no digital pitting, no sclerodactyly, no rheumatoid nodules, no livedo  MSK: 5/5 strength of the proximal and distal muscles of the upper and lower extremities.   WRIST: bilateral wrist synovitis( L>R)  HANDS:right 4th and 5th PIP synovitis+,  Elbow: No synovitis, good ROM,   Shoulder:good ROM,   Knee: no effusion, good ROM,   Ankle:bilateral synovitis+   FEET: neg forefoot squeeze test    Spine:  Normal range of motion; no tender points, no obvious deformities.    Neuro:  Alert & oriented x 3  Muscle strength: 4/4 in bilateral upper and lower extremities.    Psychiatric: Mood and affect are appropriate,

## 2024-09-06 ENCOUNTER — OFFICE VISIT (OUTPATIENT)
Dept: RHEUMATOLOGY | Age: 49
End: 2024-09-06
Payer: COMMERCIAL

## 2024-09-06 VITALS
HEART RATE: 82 BPM | DIASTOLIC BLOOD PRESSURE: 60 MMHG | WEIGHT: 160.2 LBS | BODY MASS INDEX: 27.5 KG/M2 | SYSTOLIC BLOOD PRESSURE: 122 MMHG | OXYGEN SATURATION: 98 %

## 2024-09-06 DIAGNOSIS — Z79.52 CURRENT CHRONIC USE OF SYSTEMIC STEROIDS: ICD-10-CM

## 2024-09-06 DIAGNOSIS — L40.50 PSORIATIC ARTHRITIS (HCC): Primary | ICD-10-CM

## 2024-09-06 DIAGNOSIS — L40.9 PSORIASIS: ICD-10-CM

## 2024-09-06 PROCEDURE — 99215 OFFICE O/P EST HI 40 MIN: CPT | Performed by: STUDENT IN AN ORGANIZED HEALTH CARE EDUCATION/TRAINING PROGRAM

## 2024-09-06 RX ORDER — PREDNISONE 5 MG/1
5 TABLET ORAL 2 TIMES DAILY
Qty: 180 TABLET | Refills: 0 | Status: SHIPPED | OUTPATIENT
Start: 2024-09-06

## 2024-09-06 NOTE — PATIENT INSTRUCTIONS
Take prednisone 1 tab twice daily for 7 days then 1 tab daily for 7 days then 1 tab every other day for 7 days the use as needed.   Get labs done  Apply clobetasol ointment twice daily on psoriatic scalp  RTC in 3 months

## 2025-01-18 NOTE — PROGRESS NOTES
abdominal pain, diarrhea, weight loss, skin rash, injection site reaction, hives, swelling of the face, lips or tongue. Store prefilled syring in a refrigerator. You need to inform your health care provider if you are positive for TB, crohns, ulcerative colitis or unusual allergic reaction to secukinumab.   -     CBC; Future  -     Comprehensive Metabolic Panel; Future    Encounter for other specified special examinations  -     Hepatitis Panel, Acute; Future      Current chronic use of systemic steroids  Steroids  Side effects may include increased blood glucose and possible development of diabetes mellitus, with long term use. Furthermore, long term use may result in decreased bone density and osteoporosis. There is an increased risk of osteonecrosis of bones, particularly with higher doses and prolonged use.     Explained that skin thinning and easy bruising are also a common effect. Discussed possible side effects of stimulated appetite, retained fluid edema, psychosis, and impaired sleep.      Patient aware that blood tests will be required from time to time to monitor for toxicity. It was discussed that the patient should never abruptly discontinue corticosteroid therapy when using for greater than 14 days due to risk of adrenal insufficiency that could result in death.  Patient aware that long term use requires a slow taper of dose over time to prevent adrenal insufficiency and death.         Vitamin D 25 Hydroxy; Future      Patient Instructions  Start Cosentyx as soon as you receive in the mail  Take prednisone 1 tab PRN   Daily calcium 1000-1200mg and vit d supplement 1000 units helps to preserve bone health  Get labs done today   Apply clobetasol cream twice daily on psoriatic scalp  RTC in 2 months    -  The patient indicates understanding of these issues and agrees with the plan.    I spent 40 minutes on the date of service, preparing to see the patient (eg, review of tests), obtaining and/or reviewing

## 2025-01-20 ENCOUNTER — OFFICE VISIT (OUTPATIENT)
Age: 50
End: 2025-01-20
Payer: COMMERCIAL

## 2025-01-20 ENCOUNTER — HOSPITAL ENCOUNTER (OUTPATIENT)
Age: 50
Discharge: HOME | End: 2025-01-20
Payer: COMMERCIAL

## 2025-01-20 VITALS
HEART RATE: 80 BPM | OXYGEN SATURATION: 100 % | SYSTOLIC BLOOD PRESSURE: 112 MMHG | WEIGHT: 154.2 LBS | DIASTOLIC BLOOD PRESSURE: 76 MMHG | BODY MASS INDEX: 26.32 KG/M2 | RESPIRATION RATE: 19 BRPM | HEIGHT: 64 IN

## 2025-01-20 DIAGNOSIS — Z01.89 ENCOUNTER FOR OTHER SPECIFIED SPECIAL EXAMINATIONS: ICD-10-CM

## 2025-01-20 DIAGNOSIS — Z79.52 CURRENT CHRONIC USE OF SYSTEMIC STEROIDS: ICD-10-CM

## 2025-01-20 DIAGNOSIS — L40.50 PSORIATIC ARTHRITIS (HCC): Primary | ICD-10-CM

## 2025-01-20 DIAGNOSIS — L40.50 PSORIATIC ARTHRITIS (HCC): ICD-10-CM

## 2025-01-20 DIAGNOSIS — Z79.899 HIGH RISK MEDICATION USE: ICD-10-CM

## 2025-01-20 DIAGNOSIS — L40.9 PSORIASIS: ICD-10-CM

## 2025-01-20 LAB
25(OH)D3 SERPL-MCNC: 31.3 NG/ML (ref 30–150)
ALBUMIN SERPL-MCNC: 4.1 G/DL (ref 3.4–5)
ALBUMIN/GLOB SERPL: 1.2 {RATIO} (ref 1.1–2.2)
ALP SERPL-CCNC: 72 U/L (ref 40–129)
ALT SERPL-CCNC: 18 U/L (ref 10–40)
ANION GAP SERPL CALCULATED.3IONS-SCNC: 12 MMOL/L (ref 9–17)
AST SERPL-CCNC: 17 U/L (ref 15–37)
BILIRUB SERPL-MCNC: <0.2 MG/DL (ref 0–1)
BUN SERPL-MCNC: 20 MG/DL (ref 7–20)
CALCIUM SERPL-MCNC: 9.5 MG/DL (ref 8.3–10.6)
CHLORIDE SERPL-SCNC: 107 MMOL/L (ref 99–110)
CO2 SERPL-SCNC: 21 MMOL/L (ref 21–32)
CREAT SERPL-MCNC: 0.6 MG/DL (ref 0.6–1.1)
CRP SERPL HS-MCNC: 29.4 MG/L (ref 0–5)
ERYTHROCYTE [DISTWIDTH] IN BLOOD BY AUTOMATED COUNT: 14.1 % (ref 11.7–14.9)
GFR, ESTIMATED: >90 ML/MIN/1.73M2
GLUCOSE SERPL-MCNC: 94 MG/DL (ref 74–99)
HAV IGM SERPL QL IA: NONREACTIVE
HBV CORE IGM SERPL QL IA: NONREACTIVE
HBV SURFACE AG SERPL QL IA: NONREACTIVE
HCT VFR BLD AUTO: 29.5 % (ref 37–47)
HCV AB SERPL QL IA: NONREACTIVE
HGB BLD-MCNC: 9.2 G/DL (ref 12.5–16)
MCH RBC QN AUTO: 25.7 PG (ref 27–31)
MCHC RBC AUTO-ENTMCNC: 31.2 G/DL (ref 32–36)
MCV RBC AUTO: 82.4 FL (ref 78–100)
PLATELET # BLD AUTO: 391 K/UL (ref 140–440)
PMV BLD AUTO: 9.4 FL (ref 7.5–11.1)
POTASSIUM SERPL-SCNC: 4 MMOL/L (ref 3.5–5.1)
PROT SERPL-MCNC: 7.7 G/DL (ref 6.4–8.2)
RBC # BLD AUTO: 3.58 M/UL (ref 4.2–5.4)
SODIUM SERPL-SCNC: 140 MMOL/L (ref 136–145)
WBC OTHER # BLD: 6.3 K/UL (ref 4–10.5)

## 2025-01-20 PROCEDURE — 80053 COMPREHEN METABOLIC PANEL: CPT

## 2025-01-20 PROCEDURE — 99214 OFFICE O/P EST MOD 30 MIN: CPT | Performed by: STUDENT IN AN ORGANIZED HEALTH CARE EDUCATION/TRAINING PROGRAM

## 2025-01-20 PROCEDURE — 82306 VITAMIN D 25 HYDROXY: CPT

## 2025-01-20 PROCEDURE — 36415 COLL VENOUS BLD VENIPUNCTURE: CPT

## 2025-01-20 PROCEDURE — 80074 ACUTE HEPATITIS PANEL: CPT

## 2025-01-20 PROCEDURE — 85027 COMPLETE CBC AUTOMATED: CPT

## 2025-01-20 PROCEDURE — 99215 OFFICE O/P EST HI 40 MIN: CPT | Performed by: STUDENT IN AN ORGANIZED HEALTH CARE EDUCATION/TRAINING PROGRAM

## 2025-01-20 PROCEDURE — 86140 C-REACTIVE PROTEIN: CPT

## 2025-01-20 PROCEDURE — 86480 TB TEST CELL IMMUN MEASURE: CPT

## 2025-01-20 RX ORDER — SECUKINUMAB 150 MG/ML
INJECTION SUBCUTANEOUS
Qty: 5 ML | Refills: 0 | Status: ACTIVE | OUTPATIENT
Start: 2025-01-20 | End: 2025-01-26

## 2025-01-20 RX ORDER — CLOBETASOL PROPIONATE 0.5 MG/G
CREAM TOPICAL
Qty: 60 G | Refills: 1 | Status: SHIPPED | OUTPATIENT
Start: 2025-01-20

## 2025-01-20 NOTE — PATIENT INSTRUCTIONS
Patient Instructions  Start Cosentyx as soon as you receive in the mail  Take prednisone 1 tab PRN   Daily calcium 1000-1200mg and vit d supplement 1000 units helps to preserve bone health  Get labs done today   Apply clobetasol cream twice daily on psoriatic scalp  RTC in 2 months

## 2025-01-21 ENCOUNTER — TELEPHONE (OUTPATIENT)
Dept: FAMILY MEDICINE CLINIC | Age: 50
End: 2025-01-21
Payer: COMMERCIAL

## 2025-01-21 DIAGNOSIS — D64.9 ANEMIA, UNSPECIFIED TYPE: Primary | ICD-10-CM

## 2025-01-21 PROCEDURE — 36415 COLL VENOUS BLD VENIPUNCTURE: CPT | Performed by: FAMILY MEDICINE

## 2025-01-21 NOTE — TELEPHONE ENCOUNTER
She does have anemia that has gotten worse compared to a year ago.  I put in orders to check iron ferritin and B12 and folic acid.  She should get those labs done and schedule an appointment.

## 2025-01-21 NOTE — TELEPHONE ENCOUNTER
Patient called stating she has been really cold lately. She stated she seen her rheumatologist and had blood work done. She stated the red blood cell count was low and would like you to review her labs.

## 2025-01-22 LAB
QUANTI TB GOLD PLUS: NEGATIVE
QUANTI TB1 MINUS NIL: 0.03 IU/ML
QUANTI TB2 MINUS NIL: 0.01 IU/ML
QUANTIFERON MITOGEN: 6.96 IU/ML
QUANTIFERON NIL: 0.06 IU/ML

## 2025-01-24 DIAGNOSIS — L40.50 PSORIATIC ARTHRITIS (HCC): ICD-10-CM

## 2025-01-24 DIAGNOSIS — L40.9 PSORIASIS: ICD-10-CM

## 2025-01-25 ENCOUNTER — HOSPITAL ENCOUNTER (OUTPATIENT)
Age: 50
Discharge: HOME | End: 2025-01-25
Payer: COMMERCIAL

## 2025-01-25 LAB
FERRITIN SERPL-MCNC: 71 NG/ML (ref 15–150)
FOLATE SERPL-MCNC: 6.6 NG/ML (ref 4.8–24.2)
IRON SATN MFR SERPL: 12 % (ref 15–50)
IRON SERPL-MCNC: 38 UG/DL (ref 37–145)
TIBC SERPL-MCNC: 324 UG/DL (ref 260–445)
UNSATURATED IRON BINDING CAPACITY: 286 UG/DL (ref 110–370)
VIT B12 SERPL-MCNC: 277 PG/ML (ref 211–911)

## 2025-01-25 PROCEDURE — 82607 VITAMIN B-12: CPT

## 2025-01-25 PROCEDURE — 83540 ASSAY OF IRON: CPT

## 2025-01-25 PROCEDURE — 82746 ASSAY OF FOLIC ACID SERUM: CPT

## 2025-01-25 PROCEDURE — 83550 IRON BINDING TEST: CPT

## 2025-01-25 PROCEDURE — 82728 ASSAY OF FERRITIN: CPT

## 2025-01-26 RX ORDER — SECUKINUMAB 150 MG/ML
INJECTION SUBCUTANEOUS
Qty: 5 ML | Refills: 0 | Status: SHIPPED | OUTPATIENT
Start: 2025-01-26

## 2025-01-28 ENCOUNTER — OFFICE VISIT (OUTPATIENT)
Dept: FAMILY MEDICINE CLINIC | Age: 50
End: 2025-01-28
Payer: COMMERCIAL

## 2025-01-28 VITALS
BODY MASS INDEX: 26.5 KG/M2 | OXYGEN SATURATION: 99 % | HEART RATE: 96 BPM | WEIGHT: 154.4 LBS | SYSTOLIC BLOOD PRESSURE: 116 MMHG | DIASTOLIC BLOOD PRESSURE: 74 MMHG

## 2025-01-28 DIAGNOSIS — L40.50 PSORIATIC ARTHRITIS (HCC): ICD-10-CM

## 2025-01-28 DIAGNOSIS — M19.90 CHRONIC INFLAMMATORY ARTHRITIS: ICD-10-CM

## 2025-01-28 DIAGNOSIS — Z12.12 SCREENING FOR COLORECTAL CANCER: ICD-10-CM

## 2025-01-28 DIAGNOSIS — D50.9 IRON DEFICIENCY ANEMIA, UNSPECIFIED IRON DEFICIENCY ANEMIA TYPE: Primary | ICD-10-CM

## 2025-01-28 DIAGNOSIS — Z53.20 MAMMOGRAM DECLINED: ICD-10-CM

## 2025-01-28 DIAGNOSIS — Z12.11 SCREENING FOR COLORECTAL CANCER: ICD-10-CM

## 2025-01-28 PROCEDURE — 99213 OFFICE O/P EST LOW 20 MIN: CPT | Performed by: FAMILY MEDICINE

## 2025-01-28 RX ORDER — FERROUS SULFATE 325(65) MG
325 TABLET ORAL
Qty: 90 TABLET | Refills: 2 | Status: SHIPPED | OUTPATIENT
Start: 2025-01-28

## 2025-01-28 SDOH — ECONOMIC STABILITY: INCOME INSECURITY: IN THE LAST 12 MONTHS, WAS THERE A TIME WHEN YOU WERE NOT ABLE TO PAY THE MORTGAGE OR RENT ON TIME?: NO

## 2025-01-28 SDOH — ECONOMIC STABILITY: FOOD INSECURITY: WITHIN THE PAST 12 MONTHS, YOU WORRIED THAT YOUR FOOD WOULD RUN OUT BEFORE YOU GOT MONEY TO BUY MORE.: NEVER TRUE

## 2025-01-28 SDOH — ECONOMIC STABILITY: TRANSPORTATION INSECURITY
IN THE PAST 12 MONTHS, HAS THE LACK OF TRANSPORTATION KEPT YOU FROM MEDICAL APPOINTMENTS OR FROM GETTING MEDICATIONS?: NO

## 2025-01-28 SDOH — ECONOMIC STABILITY: FOOD INSECURITY: WITHIN THE PAST 12 MONTHS, THE FOOD YOU BOUGHT JUST DIDN'T LAST AND YOU DIDN'T HAVE MONEY TO GET MORE.: NEVER TRUE

## 2025-01-28 ASSESSMENT — PATIENT HEALTH QUESTIONNAIRE - PHQ9
1. LITTLE INTEREST OR PLEASURE IN DOING THINGS: NOT AT ALL
2. FEELING DOWN, DEPRESSED OR HOPELESS: MORE THAN HALF THE DAYS
SUM OF ALL RESPONSES TO PHQ QUESTIONS 1-9: 2
SUM OF ALL RESPONSES TO PHQ QUESTIONS 1-9: 2
2. FEELING DOWN, DEPRESSED OR HOPELESS: MORE THAN HALF THE DAYS
SUM OF ALL RESPONSES TO PHQ QUESTIONS 1-9: 2
SUM OF ALL RESPONSES TO PHQ9 QUESTIONS 1 & 2: 2
1. LITTLE INTEREST OR PLEASURE IN DOING THINGS: NOT AT ALL
SUM OF ALL RESPONSES TO PHQ QUESTIONS 1-9: 2
SUM OF ALL RESPONSES TO PHQ9 QUESTIONS 1 & 2: 2

## 2025-01-28 NOTE — PROGRESS NOTES
1/28/25    Mark Morrison  1975    CECIL Flores is a 49 y.o. female who presents today for evaluation of:  Chief Complaint   Patient presents with    Discuss Labs     Red blood cells low           History of Present Illness  The patient is a 49-year-old woman who presents to discuss lab results.    She reports a low red blood cell count, as determined by her rheumatologist during a recent lab test. She has been experiencing chills and sudden temperature fluctuations, leading her to suspect an underlying issue. She is not currently on any thyroid medication and has not been prescribed iron supplements by another physician. She does not have any menstrual periods. She reports no presence of blood in her stool or urine. She recalls significant blood loss during the birth of her second child 27 years ago, which resulted in a borderline blood count that has remained low since then. She was informed that she might require a blood transfusion in the future, but she has been attempting to avoid this. She underwent a gastrointestinal examination last year, which revealed no abnormalities. She is curious about the potential side effects of iron supplements, specifically whether they could cause constipation or diarrhea.     Psoriatic arthritis: Her condition deteriorated following her diagnosis of arthritis.  She does not tolerate animal protein.  She is considering waiting until she starts Cosentyx to see if it improves her condition.  She has been diagnosed with psoriatic arthritis and rheumatoid arthritis. She has noticed that her inflammation levels increase when she consumes animal protein, so she has reduced her intake and is now primarily obtaining her protein from vegetables. She is currently experiencing cold weather and is seeking relief from her arthritis symptoms.      Allergies   Allergen Reactions    Hydroxyquinolines Hives and Itching        OBJECTIVE    /74 (Site: Left Upper Arm,

## 2025-01-29 LAB
BACTERIA URNS QL MICRO: NORMAL /HPF
BILIRUB UR QL STRIP.AUTO: NEGATIVE
CLARITY UR: CLEAR
COLOR UR: YELLOW
EPI CELLS #/AREA URNS AUTO: 2 /HPF (ref 0–5)
GLUCOSE UR STRIP.AUTO-MCNC: NEGATIVE MG/DL
HGB UR QL STRIP.AUTO: NEGATIVE
HYALINE CASTS #/AREA URNS AUTO: 3 /LPF (ref 0–8)
KETONES UR STRIP.AUTO-MCNC: NEGATIVE MG/DL
LEUKOCYTE ESTERASE UR QL STRIP.AUTO: NEGATIVE
NITRITE UR QL STRIP.AUTO: NEGATIVE
PH UR STRIP.AUTO: 7.5 [PH] (ref 5–8)
PROT UR STRIP.AUTO-MCNC: ABNORMAL MG/DL
RBC CLUMPS #/AREA URNS AUTO: 1 /HPF (ref 0–4)
SP GR UR STRIP.AUTO: 1.02 (ref 1–1.03)
UA COMPLETE W REFLEX CULTURE PNL UR: ABNORMAL
UA DIPSTICK W REFLEX MICRO PNL UR: YES
URN SPEC COLLECT METH UR: ABNORMAL
UROBILINOGEN UR STRIP-ACNC: 0.2 E.U./DL
WBC #/AREA URNS AUTO: 1 /HPF (ref 0–5)

## 2025-02-01 ENCOUNTER — HOSPITAL ENCOUNTER (OUTPATIENT)
Age: 50
Discharge: HOME | End: 2025-02-01
Payer: COMMERCIAL

## 2025-02-01 PROCEDURE — 36415 COLL VENOUS BLD VENIPUNCTURE: CPT

## 2025-02-01 PROCEDURE — 86480 TB TEST CELL IMMUN MEASURE: CPT

## 2025-02-04 LAB
QUANTI TB GOLD PLUS: NEGATIVE
QUANTI TB1 MINUS NIL: 0.04 IU/ML
QUANTI TB2 MINUS NIL: 0.05 IU/ML
QUANTIFERON MITOGEN: 5.41 IU/ML
QUANTIFERON NIL: 0.13 IU/ML

## 2025-02-19 ENCOUNTER — TELEPHONE (OUTPATIENT)
Age: 50
End: 2025-02-19

## 2025-02-20 ENCOUNTER — TELEPHONE (OUTPATIENT)
Age: 50
End: 2025-02-20

## 2025-02-20 NOTE — TELEPHONE ENCOUNTER
[facility] called and would like to know if the patient should be on the 150 mg dose of Cosentyx or the 300 mg dose since the the patient has psoriatic arthritis and plaque psoriasis? Please advise. Please call pharmacy at [phone] with correct dose

## 2025-02-23 DIAGNOSIS — L40.9 PSORIASIS: ICD-10-CM

## 2025-02-23 DIAGNOSIS — L40.50 PSORIATIC ARTHRITIS (HCC): ICD-10-CM

## 2025-02-23 RX ORDER — SECUKINUMAB 150 MG/ML
INJECTION SUBCUTANEOUS
Qty: 10 ML | Refills: 0 | Status: SHIPPED | OUTPATIENT
Start: 2025-02-23

## 2025-02-24 DIAGNOSIS — L40.50 PSORIATIC ARTHRITIS (HCC): ICD-10-CM

## 2025-02-24 DIAGNOSIS — L40.9 PSORIASIS: ICD-10-CM

## 2025-02-24 RX ORDER — SECUKINUMAB 300 MG/2ML
300 INJECTION SUBCUTANEOUS
Qty: 2 ML | Refills: 2 | Status: ACTIVE | OUTPATIENT
Start: 2025-02-24

## 2025-02-24 RX ORDER — SECUKINUMAB 300 MG/2ML
INJECTION SUBCUTANEOUS
Qty: 5 ADJUSTABLE DOSE PRE-FILLED PEN SYRINGE | Refills: 0 | Status: ACTIVE | OUTPATIENT
Start: 2025-02-24

## 2025-02-27 ENCOUNTER — TELEPHONE (OUTPATIENT)
Age: 50
End: 2025-02-27

## 2025-02-27 NOTE — TELEPHONE ENCOUNTER
Prior auth has been denied for the patient. Would you like to complete a peer to peer? Please advise

## 2025-03-03 DIAGNOSIS — L40.9 PSORIASIS: ICD-10-CM

## 2025-03-03 DIAGNOSIS — L40.50 PSORIATIC ARTHRITIS (HCC): ICD-10-CM

## 2025-03-11 DIAGNOSIS — L40.50 PSORIATIC ARTHRITIS (HCC): ICD-10-CM

## 2025-03-11 RX ORDER — PREDNISONE 5 MG/1
5 TABLET ORAL 2 TIMES DAILY
Qty: 180 TABLET | Refills: 0 | OUTPATIENT
Start: 2025-03-11

## 2025-03-13 ENCOUNTER — TELEPHONE (OUTPATIENT)
Age: 50
End: 2025-03-13

## 2025-03-14 DIAGNOSIS — L40.50 PSORIATIC ARTHRITIS (HCC): ICD-10-CM

## 2025-03-14 RX ORDER — PREDNISONE 5 MG/1
5 TABLET ORAL PRN
Qty: 30 TABLET | Refills: 0 | Status: SHIPPED | OUTPATIENT
Start: 2025-03-14

## 2025-03-22 NOTE — PROGRESS NOTES
RHEUMATOLOGY FOLLOW UP VISIT    3/24/2025      Patient Name: Mark Morrison  : 1975  Medical Record: [id number]      CHIEF COMPLAINT  PsA- Nail changes, hx of PSO, asymmetrical arthritis   RF+     Pertinent Problems  Gastritis  Diabetes Mellitus   Scalp psoriasis    HISTORY OF PRESENT ILLNESS    Mark Morrison is a 49 y.o. female established since 10/17/2022  Problem began in 2020 which began with left shoulder pain that worsened to neck, shoulder pain then bilateral hand swelling and puffiness. She establised care with outside rheumatologist who treated with MTX 17.5mg that caused hair loss and symptom did not improve. Took Humira for almost 6 weeks that caused abdominal hives. Started Xeljanz which helped her joints significantly, she took for 3 months that caused elevated liver enzymes then it was held. SSZ did not help her joints   She has been getting prednisone refills from PCP since 2021. Hx of HCQ use in 2022 took for 5 days and broke out in hives. 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 She failed leflunomide    LCV 2025  Biologics are not approved by her insurance. Efforts to enroll her in [employer] and xeljanz discount program failed  Apremilast was started on 2023 and stopped. It dis not help  [employer] stopped covering apremilast in 2024 so she stopped.   Cosentyx was ordered on 2025  Pain intensity 7/10       Subjective:  Today she reports that she has new insurance   So far she has received 2 injections in her loading dose and joint pain is improving.  She is able to get up from her commode without assistance  Scalp pso feels better  She is currently taking prednisone only as needed   She takes Vit D and calcium   Stiffness improves with activity   Pain intensity  4 /10  She uses Ibuprofen only as needed       Current rheum meds:   Clobetasol ointment, takes prednisone as needed

## 2025-03-24 ENCOUNTER — OFFICE VISIT (OUTPATIENT)
Age: 50
End: 2025-03-24
Payer: COMMERCIAL

## 2025-03-24 VITALS
OXYGEN SATURATION: 98 % | DIASTOLIC BLOOD PRESSURE: 82 MMHG | BODY MASS INDEX: 27.94 KG/M2 | WEIGHT: 162.8 LBS | HEART RATE: 81 BPM | SYSTOLIC BLOOD PRESSURE: 122 MMHG

## 2025-03-24 DIAGNOSIS — L40.50 PSORIATIC ARTHRITIS (HCC): Primary | ICD-10-CM

## 2025-03-24 DIAGNOSIS — Z79.899 HIGH RISK MEDICATION USE: ICD-10-CM

## 2025-03-24 DIAGNOSIS — Z01.89 ENCOUNTER FOR OTHER SPECIFIED SPECIAL EXAMINATIONS: ICD-10-CM

## 2025-03-24 DIAGNOSIS — Z79.52 CURRENT CHRONIC USE OF SYSTEMIC STEROIDS: ICD-10-CM

## 2025-03-24 DIAGNOSIS — L40.9 PSORIASIS: ICD-10-CM

## 2025-03-24 PROCEDURE — 99215 OFFICE O/P EST HI 40 MIN: CPT | Performed by: STUDENT IN AN ORGANIZED HEALTH CARE EDUCATION/TRAINING PROGRAM

## 2025-03-24 RX ORDER — PREDNISONE 5 MG/1
5 TABLET ORAL DAILY PRN
Qty: 30 TABLET | Refills: 0 | Status: SHIPPED | OUTPATIENT
Start: 2025-03-24

## 2025-03-24 NOTE — PATIENT INSTRUCTIONS
Patient Instructions  Continue Cosentyx   Take prednisone 1 tab PRN   Daily calcium 1000-1200mg and vit d supplement 1000 units helps to preserve bone health  Apply clobetasol cream twice daily on psoriatic scalp  RTC in 2 months

## 2025-05-27 ENCOUNTER — COMMUNITY OUTREACH (OUTPATIENT)
Dept: FAMILY MEDICINE CLINIC | Age: 50
End: 2025-05-27

## 2025-05-27 NOTE — PROGRESS NOTES
Patient's HM shows they are overdue for Colorectal Screening and mammogram.   Care Everywhere and  files searched.  No results to attach to order nor HM updated.     Patient declines both screenings.

## 2025-05-31 NOTE — PROGRESS NOTES
RHEUMATOLOGY FOLLOW UP VISIT    2025      Patient Name: Mark Morrison  : 1975  Medical Record: [id number]      CHIEF COMPLAINT  PsA- Nail changes, hx of PSO, asymmetrical arthritis   RF+     Pertinent Problems  Gastritis  Diabetes Mellitus   Scalp psoriasis    HISTORY OF PRESENT ILLNESS    Mark Morrison is a 49 y.o. female established since 10/17/2022  Problem began in 2020 which began with left shoulder pain that worsened to neck, shoulder pain then bilateral hand swelling and puffiness. She establised care with outside rheumatologist who treated with MTX 17.5mg that caused hair loss and symptom did not improve. Took Humira for almost 6 weeks that caused abdominal hives. Started Xeljanz which helped her joints significantly, she took for 3 months that caused elevated liver enzymes then it was held. SSZ did not help her joints   She has been getting prednisone refills from PCP since 2021. Hx of HCQ use in 2022 took for 5 days and broke out in hives. 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 She failed leflunomide    LCV 3/24/2025  Biologics are not approved by her insurance. Efforts to enroll her in [employer] and xeljanz discount program failed   She changed her insurance and cosentyx was ordered on 2025  Apremilast was started on 2023 and stopped. It dis not help  [facility] stopped covering apremilast in 2024 so she stopped.   Pain intensity 7/10       Subjective:  Pt presents today after starting Cosentyx in March. Pt states she had her loading dose in March and then received her April dose, however, the pt notes that in May she was told there was only a small amount of money on her co-pay card, and she would have to pay the remaining balance. Pt states her last dose of Cosentyx was April 10. Since then, the pt reports taking Ibuprofen and Prednisone 5mg as needed.  Pt notes that her joint

## 2025-06-02 ENCOUNTER — OFFICE VISIT (OUTPATIENT)
Age: 50
End: 2025-06-02
Payer: COMMERCIAL

## 2025-06-02 VITALS
WEIGHT: 167 LBS | DIASTOLIC BLOOD PRESSURE: 64 MMHG | HEART RATE: 101 BPM | BODY MASS INDEX: 28.67 KG/M2 | OXYGEN SATURATION: 98 % | SYSTOLIC BLOOD PRESSURE: 108 MMHG

## 2025-06-02 DIAGNOSIS — Z79.52 CURRENT CHRONIC USE OF SYSTEMIC STEROIDS: ICD-10-CM

## 2025-06-02 DIAGNOSIS — L40.9 PSORIASIS: ICD-10-CM

## 2025-06-02 DIAGNOSIS — Z79.899 HIGH RISK MEDICATION USE: ICD-10-CM

## 2025-06-02 DIAGNOSIS — L40.50 PSORIATIC ARTHRITIS (HCC): Primary | ICD-10-CM

## 2025-06-02 PROCEDURE — 99215 OFFICE O/P EST HI 40 MIN: CPT | Performed by: STUDENT IN AN ORGANIZED HEALTH CARE EDUCATION/TRAINING PROGRAM

## 2025-06-02 RX ORDER — PREDNISONE 5 MG/1
5 TABLET ORAL 2 TIMES DAILY
Qty: 60 TABLET | Refills: 1 | Status: SHIPPED | OUTPATIENT
Start: 2025-06-02 | End: 2025-08-01

## 2025-06-02 NOTE — PATIENT INSTRUCTIONS
We will contact our prior authorization department regarding Cosentyx/biologic medication funding   Take prednisone 2 tab daily  Daily calcium 1000-1200mg and vit d supplement 1000 units helps to preserve bone health  Apply clobetasol cream twice daily on psoriatic scalp  RTC in 2 months  Get labs one week prior to your next visit

## 2025-06-06 NOTE — ASSESSMENT & PLAN NOTE
She is having symptomatic menopause and we will treat with estradiol patches. I will start with a middle dose and we can adjust up or down depending on her symptom control. (E1) none

## 2025-08-04 ENCOUNTER — OFFICE VISIT (OUTPATIENT)
Age: 50
End: 2025-08-04
Payer: COMMERCIAL

## 2025-08-04 ENCOUNTER — TELEPHONE (OUTPATIENT)
Age: 50
End: 2025-08-04

## 2025-08-04 VITALS
SYSTOLIC BLOOD PRESSURE: 124 MMHG | BODY MASS INDEX: 27.09 KG/M2 | WEIGHT: 157.8 LBS | DIASTOLIC BLOOD PRESSURE: 82 MMHG | OXYGEN SATURATION: 97 % | HEART RATE: 72 BPM

## 2025-08-04 DIAGNOSIS — L40.9 PSORIASIS: ICD-10-CM

## 2025-08-04 DIAGNOSIS — Z79.52 CURRENT CHRONIC USE OF SYSTEMIC STEROIDS: ICD-10-CM

## 2025-08-04 DIAGNOSIS — L40.50 PSORIATIC ARTHRITIS (HCC): Primary | ICD-10-CM

## 2025-08-04 DIAGNOSIS — Z79.899 HIGH RISK MEDICATION USE: ICD-10-CM

## 2025-08-04 PROCEDURE — 99213 OFFICE O/P EST LOW 20 MIN: CPT | Performed by: NURSE PRACTITIONER

## (undated) DEVICE — Z DUP USE 2149365 FORCEPS BX L240CM JAW DIA2.8MM L CAP W/ NDL MIC MESH TOOTH

## (undated) DEVICE — ENDOSCOPY KIT: Brand: MEDLINE INDUSTRIES, INC.